# Patient Record
Sex: FEMALE | Race: WHITE | Employment: FULL TIME | URBAN - METROPOLITAN AREA
[De-identification: names, ages, dates, MRNs, and addresses within clinical notes are randomized per-mention and may not be internally consistent; named-entity substitution may affect disease eponyms.]

---
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
## 2021-10-15 ENCOUNTER — HOSPITAL ENCOUNTER (EMERGENCY)
Age: 21
Discharge: HOME | End: 2021-10-15
Attending: EMERGENCY MEDICINE

## 2021-10-15 ENCOUNTER — APPOINTMENT (OUTPATIENT)
Dept: GENERAL RADIOLOGY | Age: 21
End: 2021-10-15
Attending: EMERGENCY MEDICINE

## 2021-10-15 VITALS
HEART RATE: 99 BPM | WEIGHT: 180 LBS | DIASTOLIC BLOOD PRESSURE: 63 MMHG | OXYGEN SATURATION: 96 % | SYSTOLIC BLOOD PRESSURE: 120 MMHG | RESPIRATION RATE: 16 BRPM | HEIGHT: 62 IN | BODY MASS INDEX: 33.13 KG/M2 | TEMPERATURE: 98.6 F

## 2021-10-15 DIAGNOSIS — R07.89 CHEST WALL PAIN: Primary | ICD-10-CM

## 2021-10-15 LAB
ALBUMIN SERPL-MCNC: 4.2 G/DL (ref 3.5–5)
ALBUMIN/GLOB SERPL: 1 {RATIO} (ref 1.2–3.5)
ALP SERPL-CCNC: 78 U/L (ref 50–136)
ALT SERPL-CCNC: 30 U/L (ref 12–65)
ANION GAP SERPL CALC-SCNC: 7 MMOL/L (ref 7–16)
AST SERPL-CCNC: 23 U/L (ref 15–37)
ATRIAL RATE: 95 BPM
BASOPHILS # BLD: 0 K/UL (ref 0–0.2)
BASOPHILS NFR BLD: 1 % (ref 0–2)
BILIRUB SERPL-MCNC: 0.6 MG/DL (ref 0.2–1.1)
BUN SERPL-MCNC: 9 MG/DL (ref 6–23)
CALCIUM SERPL-MCNC: 9.4 MG/DL (ref 8.3–10.4)
CALCULATED P AXIS, ECG09: 32 DEGREES
CALCULATED R AXIS, ECG10: 100 DEGREES
CALCULATED T AXIS, ECG11: 39 DEGREES
CHLORIDE SERPL-SCNC: 106 MMOL/L (ref 98–107)
CO2 SERPL-SCNC: 24 MMOL/L (ref 21–32)
CREAT SERPL-MCNC: 0.85 MG/DL (ref 0.6–1)
DIAGNOSIS, 93000: NORMAL
DIFFERENTIAL METHOD BLD: NORMAL
EOSINOPHIL # BLD: 0.4 K/UL (ref 0–0.8)
EOSINOPHIL NFR BLD: 5 % (ref 0.5–7.8)
ERYTHROCYTE [DISTWIDTH] IN BLOOD BY AUTOMATED COUNT: 12.2 % (ref 11.9–14.6)
GLOBULIN SER CALC-MCNC: 4.3 G/DL (ref 2.3–3.5)
GLUCOSE SERPL-MCNC: 77 MG/DL (ref 65–100)
HCT VFR BLD AUTO: 40.4 % (ref 35.8–46.3)
HGB BLD-MCNC: 13.4 G/DL (ref 11.7–15.4)
IMM GRANULOCYTES # BLD AUTO: 0 K/UL (ref 0–0.5)
IMM GRANULOCYTES NFR BLD AUTO: 0 % (ref 0–5)
LIPASE SERPL-CCNC: 59 U/L (ref 73–393)
LYMPHOCYTES # BLD: 2 K/UL (ref 0.5–4.6)
LYMPHOCYTES NFR BLD: 23 % (ref 13–44)
MAGNESIUM SERPL-MCNC: 2.1 MG/DL (ref 1.8–2.4)
MCH RBC QN AUTO: 28 PG (ref 26.1–32.9)
MCHC RBC AUTO-ENTMCNC: 33.2 G/DL (ref 31.4–35)
MCV RBC AUTO: 84.5 FL (ref 79.6–97.8)
MONOCYTES # BLD: 1 K/UL (ref 0.1–1.3)
MONOCYTES NFR BLD: 12 % (ref 4–12)
NEUTS SEG # BLD: 5.1 K/UL (ref 1.7–8.2)
NEUTS SEG NFR BLD: 60 % (ref 43–78)
NRBC # BLD: 0 K/UL (ref 0–0.2)
P-R INTERVAL, ECG05: 118 MS
PLATELET # BLD AUTO: 319 K/UL (ref 150–450)
PMV BLD AUTO: 9.4 FL (ref 9.4–12.3)
POTASSIUM SERPL-SCNC: 3.7 MMOL/L (ref 3.5–5.1)
PROT SERPL-MCNC: 8.5 G/DL (ref 6.3–8.2)
Q-T INTERVAL, ECG07: 340 MS
QRS DURATION, ECG06: 80 MS
QTC CALCULATION (BEZET), ECG08: 427 MS
RBC # BLD AUTO: 4.78 M/UL (ref 4.05–5.2)
SODIUM SERPL-SCNC: 137 MMOL/L (ref 136–145)
TROPONIN-HIGH SENSITIVITY: 4.5 PG/ML (ref 0–14)
TROPONIN-HIGH SENSITIVITY: 4.6 PG/ML (ref 0–14)
VENTRICULAR RATE, ECG03: 95 BPM
WBC # BLD AUTO: 8.6 K/UL (ref 4.3–11.1)

## 2021-10-15 PROCEDURE — 83690 ASSAY OF LIPASE: CPT

## 2021-10-15 PROCEDURE — 74011250637 HC RX REV CODE- 250/637: Performed by: PHYSICIAN ASSISTANT

## 2021-10-15 PROCEDURE — 84484 ASSAY OF TROPONIN QUANT: CPT

## 2021-10-15 PROCEDURE — 83735 ASSAY OF MAGNESIUM: CPT

## 2021-10-15 PROCEDURE — 99285 EMERGENCY DEPT VISIT HI MDM: CPT

## 2021-10-15 PROCEDURE — 85025 COMPLETE CBC W/AUTO DIFF WBC: CPT

## 2021-10-15 PROCEDURE — 80053 COMPREHEN METABOLIC PANEL: CPT

## 2021-10-15 PROCEDURE — 74011250636 HC RX REV CODE- 250/636: Performed by: PHYSICIAN ASSISTANT

## 2021-10-15 PROCEDURE — 71046 X-RAY EXAM CHEST 2 VIEWS: CPT

## 2021-10-15 PROCEDURE — 94762 N-INVAS EAR/PLS OXIMTRY CONT: CPT

## 2021-10-15 PROCEDURE — 93005 ELECTROCARDIOGRAM TRACING: CPT | Performed by: EMERGENCY MEDICINE

## 2021-10-15 PROCEDURE — 96374 THER/PROPH/DIAG INJ IV PUSH: CPT

## 2021-10-15 RX ORDER — LIDOCAINE 50 MG/G
PATCH TOPICAL
Qty: 5 EACH | Refills: 0 | Status: SHIPPED | OUTPATIENT
Start: 2021-10-15

## 2021-10-15 RX ORDER — SODIUM CHLORIDE 0.9 % (FLUSH) 0.9 %
5-10 SYRINGE (ML) INJECTION AS NEEDED
Status: DISCONTINUED | OUTPATIENT
Start: 2021-10-15 | End: 2021-10-15 | Stop reason: HOSPADM

## 2021-10-15 RX ORDER — LORAZEPAM 1 MG/1
1 TABLET ORAL
Status: COMPLETED | OUTPATIENT
Start: 2021-10-15 | End: 2021-10-15

## 2021-10-15 RX ORDER — IBUPROFEN 600 MG/1
600 TABLET ORAL
Qty: 20 TABLET | Refills: 0 | Status: SHIPPED | OUTPATIENT
Start: 2021-10-15

## 2021-10-15 RX ORDER — KETOROLAC TROMETHAMINE 15 MG/ML
15 INJECTION, SOLUTION INTRAMUSCULAR; INTRAVENOUS
Status: COMPLETED | OUTPATIENT
Start: 2021-10-15 | End: 2021-10-15

## 2021-10-15 RX ORDER — SODIUM CHLORIDE 0.9 % (FLUSH) 0.9 %
5-10 SYRINGE (ML) INJECTION EVERY 8 HOURS
Status: DISCONTINUED | OUTPATIENT
Start: 2021-10-15 | End: 2021-10-15 | Stop reason: HOSPADM

## 2021-10-15 RX ADMIN — LORAZEPAM 1 MG: 1 TABLET ORAL at 15:39

## 2021-10-15 RX ADMIN — KETOROLAC TROMETHAMINE 15 MG: 15 INJECTION, SOLUTION INTRAMUSCULAR; INTRAVENOUS at 15:40

## 2021-10-15 NOTE — DISCHARGE INSTRUCTIONS
Take Tylenol every 4-6 hours and ibuprofen every 6-8 hours as needed for pain. You may apply Lidoderm patch to the area for symptom relief as well. In plenty fluids and rest, please follow-up closely with the primary doctor and return for any emergent or severely worsening symptoms.

## 2021-10-15 NOTE — Clinical Note
[address] [facility] EMERGENCY DEPT   [facility]  [place] [facility] [id number] [phone]    Work/School Note    Date: 10/15/2021    To Whom It May concern:    Livier Colmenares was seen and treated today in the emergency room by the following provider(s):  Attending Provider: Keith Jackson DO  Physician Assistant: Vicki Hurtado. Livier Colmenares is excused from work/school on 10/15/21 and 10/16/21. She is medically clear to return to work/school on 10/17/2021.        Sincerely,          Rizwana Perez, [address]

## 2021-10-15 NOTE — ED PROVIDER NOTES
59-year-old previously healthy female comes in with concern for chest pain that started at 3 in the morning. Patient reports pain is worse with certain movements and with deep inspiration. Patient has no cough congestion or fever. She denies hemoptysis, leg swelling or shortness of breath. She denies recent immobilization or history of blood clots. She denies nausea, vomiting, diarrhea or dysuria. No past medical history on file. Past Surgical History:   Procedure Laterality Date    HX APPENDECTOMY           No family history on file. Social History     Socioeconomic History    Marital status: SINGLE     Spouse name: Not on file    Number of children: Not on file    Years of education: Not on file    Highest education level: Not on file   Occupational History    Not on file   Tobacco Use    Smoking status: Never Smoker    Smokeless tobacco: Never Used   Substance and Sexual Activity    Alcohol use: No    Drug use: Never    Sexual activity: Yes     Partners: Male   Other Topics Concern    Not on file   Social History Narrative    Not on file     Social Determinants of Health     Financial Resource Strain:     Difficulty of Paying Living Expenses:    Food Insecurity:     Worried About Running Out of Food in the Last Year:     [address] in the Last Year:    Transportation Needs:     Lack of Transportation (Medical):      Lack of Transportation (Non-Medical):    Physical Activity:     Days of Exercise per Week:     Minutes of Exercise per Session:    Stress:     Feeling of Stress :    Social Connections:     Frequency of Communication with Friends and Family:     Frequency of Social Gatherings with Friends and Family:     Attends Scientologist Services:     Active Member of Clubs or Organizations:     Attends Club or Organization Meetings:     Marital Status:    Intimate Partner Violence:     Fear of Current or Ex-Partner:     Emotionally Abused:     Physically Abused:  Sexually Abused: ALLERGIES: Patient has no known allergies. Review of Systems   Constitutional: Negative for chills and fever. Respiratory: Negative for cough and shortness of breath. Cardiovascular: Positive for chest pain. Negative for palpitations and leg swelling. Gastrointestinal: Negative for abdominal pain, nausea and vomiting. Skin: Negative for color change. All other systems reviewed and are negative. Vitals:    10/15/21 1215   BP: (!) 137/90   Pulse: 99   Resp: 16   Temp: 98.6 °F (37 °C)   SpO2: 98%   Weight: 81.6 kg (180 lb)   Height: 5' 2\" (1.575 m)            Physical Exam  Vitals and nursing note reviewed. Constitutional:       General: She is not in acute distress. Appearance: Normal appearance. She is not ill-appearing, toxic-appearing or diaphoretic. HENT:      Head: Normocephalic and atraumatic. Eyes:      Conjunctiva/sclera: Conjunctivae normal.      Pupils: Pupils are equal, round, and reactive to light. Neck:      Vascular: No JVD. Cardiovascular:      Rate and Rhythm: Normal rate and regular rhythm. Pulses: Normal pulses. Heart sounds: Normal heart sounds. Heart sounds not distant. No murmur heard. No systolic murmur is present. No diastolic murmur is present. No friction rub. No gallop. No S3 or S4 sounds. Pulmonary:      Effort: Pulmonary effort is normal. No respiratory distress. Breath sounds: Normal breath sounds and air entry. No stridor, decreased air movement or transmitted upper airway sounds. No decreased breath sounds, wheezing, rhonchi or rales. Chest:      Chest wall: No crepitus. There is no dullness to percussion. Abdominal:      General: Bowel sounds are normal.      Palpations: Abdomen is soft. Tenderness: There is no abdominal tenderness. Musculoskeletal:      Cervical back: Normal range of motion and neck supple. Right lower leg: No tenderness. No edema. Left lower leg: No tenderness. No edema. Skin:     General: Skin is warm and dry. Capillary Refill: Capillary refill takes less than 2 seconds. Neurological:      General: No focal deficit present. Mental Status: She is alert and oriented to person, place, and time. Mental status is at baseline. Psychiatric:         Mood and Affect: Mood is anxious. MDM  Number of Diagnoses or Management Options  Chest wall pain: new and requires workup  Diagnosis management comments: 80-year-old previously healthy female comes in with concern for chest pain that started at 3 in the morning. Patient reports pain is worse with certain movements and with deep inspiration. Patient has no cough congestion or fever. She is well-appearing nontoxic and afebrile. Patient does report she is mildly anxious and has history of this but has not had chest pain with her anxiety in the past.  Will check labs, EKG, chest x-ray, troponin x2. EKG shows no ischemic changes or abnormality, the chest x-ray was negative and the labs are unremarkable including negative troponin. Patient was given a dose of Toradol and Ativan. We will advise close follow-up with PCP and return precautions are discussed. Patient also advised to apply Lidoderm patches to the area for symptom relief as well as to take Tylenol and ibuprofen as needed for pain. If anxiety-like symptoms persist, advised to follow-up with the PCP for further evaluation and treatment. EKG    Date/Time: 10/15/2021 3:24 PM  Performed by: SUZANNE Sy  Authorized by: SUZANNE Sy     Previous ECG:     Previous ECG:  Unavailable  Comments:      EKG shows normal sinus rhythm, rate of 95 bpm, rightward axis, normal intervals, , QTc 427, no acute ST or T wave changes, no STEMI.

## 2021-10-15 NOTE — ED NOTES
I have reviewed discharge instructions with the patient. The patient verbalized understanding. Patient left ED via Discharge Method: ambulatory to Home with boyfriend  Opportunity for questions and clarification provided. Patient given 2 scripts. To continue your aftercare when you leave the hospital, you may receive an automated call from our care team to check in on how you are doing. This is a free service and part of our promise to provide the best care and service to meet your aftercare needs.  If you have questions, or wish to unsubscribe from this service please call [phone]. Thank you for Choosing our [facility] Emergency Department.

## 2022-03-18 PROBLEM — R07.89 CHEST WALL PAIN: Status: ACTIVE | Noted: 2021-10-15

## 2022-09-08 ENCOUNTER — INITIAL PRENATAL (OUTPATIENT)
Dept: OBGYN CLINIC | Age: 22
End: 2022-09-08
Payer: MEDICAID

## 2022-09-08 VITALS — BODY MASS INDEX: 31.65 KG/M2 | HEIGHT: 62 IN | WEIGHT: 172 LBS

## 2022-09-08 DIAGNOSIS — Z3A.13 13 WEEKS GESTATION OF PREGNANCY: ICD-10-CM

## 2022-09-08 DIAGNOSIS — O36.80X0 ENCOUNTER TO DETERMINE FETAL VIABILITY OF PREGNANCY, SINGLE OR UNSPECIFIED FETUS: ICD-10-CM

## 2022-09-08 DIAGNOSIS — Z13.79 GENETIC TESTING: Primary | ICD-10-CM

## 2022-09-08 DIAGNOSIS — Z32.01 PREGNANCY TEST POSITIVE: ICD-10-CM

## 2022-09-08 DIAGNOSIS — Z34.02 ENCOUNTER FOR SUPERVISION OF NORMAL FIRST PREGNANCY IN SECOND TRIMESTER: ICD-10-CM

## 2022-09-08 PROBLEM — R07.89 CHEST WALL PAIN: Status: RESOLVED | Noted: 2021-10-15 | Resolved: 2022-09-08

## 2022-09-08 PROBLEM — Z34.90 PREGNANCY: Status: ACTIVE | Noted: 2022-09-08

## 2022-09-08 LAB
ABO + RH BLD: NORMAL
ABO, EXTERNAL RESULT: NORMAL
BASOPHILS # BLD: 0 K/UL (ref 0–0.2)
BASOPHILS NFR BLD: 1 % (ref 0–2)
BLOOD GROUP ANTIBODIES SERPL: NORMAL
DIFFERENTIAL METHOD BLD: ABNORMAL
EOSINOPHIL # BLD: 0.1 K/UL (ref 0–0.8)
EOSINOPHIL NFR BLD: 1 % (ref 0.5–7.8)
ERYTHROCYTE [DISTWIDTH] IN BLOOD BY AUTOMATED COUNT: 12.3 % (ref 11.9–14.6)
HBV SURFACE AG SER QL: NONREACTIVE
HCG, PREGNANCY, URINE, POC: POSITIVE
HCT VFR BLD AUTO: 36.3 % (ref 35.8–46.3)
HCV AB SER QL: NONREACTIVE
HEP B, EXTERNAL RESULT: NORMAL
HEPATITIS C ANTIBODY, EXTERNAL RESULT: NORMAL
HGB BLD-MCNC: 12.5 G/DL (ref 11.7–15.4)
HIV 1+2 AB+HIV1 P24 AG SERPL QL IA: NONREACTIVE
HIV 1/2 RESULT COMMENT: NORMAL
HIV, EXTERNAL RESULT: NORMAL
IMM GRANULOCYTES # BLD AUTO: 0 K/UL (ref 0–0.5)
IMM GRANULOCYTES NFR BLD AUTO: 0 % (ref 0–5)
LYMPHOCYTES # BLD: 0.7 K/UL (ref 0.5–4.6)
LYMPHOCYTES NFR BLD: 10 % (ref 13–44)
MCH RBC QN AUTO: 29.3 PG (ref 26.1–32.9)
MCHC RBC AUTO-ENTMCNC: 34.4 G/DL (ref 31.4–35)
MCV RBC AUTO: 85.2 FL (ref 79.6–97.8)
MONOCYTES # BLD: 0.9 K/UL (ref 0.1–1.3)
MONOCYTES NFR BLD: 12 % (ref 4–12)
NEUTS SEG # BLD: 5.8 K/UL (ref 1.7–8.2)
NEUTS SEG NFR BLD: 76 % (ref 43–78)
NRBC # BLD: 0 K/UL (ref 0–0.2)
PLATELET # BLD AUTO: 263 K/UL (ref 150–450)
PMV BLD AUTO: 10.2 FL (ref 9.4–12.3)
RBC # BLD AUTO: 4.26 M/UL (ref 4.05–5.2)
RH FACTOR, EXTERNAL RESULT: POSITIVE
RPR, EXTERNAL RESULT: NORMAL
RUBELLA TITER, EXTERNAL RESULT: NORMAL
RUBV IGG SERPL IA-ACNC: 129.5 IU/ML (ref 0–50)
VALID INTERNAL CONTROL, POC: YES
WBC # BLD AUTO: 7.4 K/UL (ref 4.3–11.1)

## 2022-09-08 PROCEDURE — 76801 OB US < 14 WKS SINGLE FETUS: CPT | Performed by: NURSE PRACTITIONER

## 2022-09-08 PROCEDURE — 81025 URINE PREGNANCY TEST: CPT | Performed by: NURSE PRACTITIONER

## 2022-09-08 PROCEDURE — 76817 TRANSVAGINAL US OBSTETRIC: CPT | Performed by: NURSE PRACTITIONER

## 2022-09-08 NOTE — PROGRESS NOTES
Poppy Perez G1, P0 presents to the office today for NOB talk and ultrasound. EDC is 3/16/23 based off of LMP. Patient education was discussed including: nutrition, appropriate weight gain, diet, exercise, travel, hospital classes, breastfeeding/lactation services, flu vaccine, Tdap, glucola, GBS, and Corona Virus and Zika precautions. Genetic testing discussed in depth and patient elects NIPT. She is to return to the office in 1 weeks for NOB exam. All questions answered and she voiced full understanding. She is encouraged to call the office with any questions or concerns.

## 2022-09-09 LAB
RPR SER QL: NONREACTIVE
VZV IGG SER IA-ACNC: <135 INDEX

## 2022-09-12 LAB
BACTERIA SPEC CULT: NORMAL
BACTERIA SPEC CULT: NORMAL
SERVICE CMNT-IMP: NORMAL

## 2022-09-13 LAB
HGB A MFR BLD: 97.8 % (ref 96.4–98.8)
HGB A2 MFR BLD COLUMN CHROM: 2.2 % (ref 1.8–3.2)
HGB F MFR BLD: 0 % (ref 0–2)
HGB FRACT BLD-IMP: NORMAL
HGB S MFR BLD: 0 %

## 2022-09-14 ENCOUNTER — ROUTINE PRENATAL (OUTPATIENT)
Dept: OBGYN CLINIC | Age: 22
End: 2022-09-14
Payer: MEDICAID

## 2022-09-14 VITALS — DIASTOLIC BLOOD PRESSURE: 76 MMHG | WEIGHT: 172.8 LBS | SYSTOLIC BLOOD PRESSURE: 126 MMHG | BODY MASS INDEX: 31.61 KG/M2

## 2022-09-14 DIAGNOSIS — Z34.02 ENCOUNTER FOR SUPERVISION OF NORMAL FIRST PREGNANCY IN SECOND TRIMESTER: Primary | ICD-10-CM

## 2022-09-14 DIAGNOSIS — Z11.8 SCREENING FOR VIRAL AND CHLAMYDIAL DISEASES: ICD-10-CM

## 2022-09-14 DIAGNOSIS — O09.899 MATERNAL VARICELLA, NON-IMMUNE: ICD-10-CM

## 2022-09-14 DIAGNOSIS — Z3A.13 13 WEEKS GESTATION OF PREGNANCY: ICD-10-CM

## 2022-09-14 DIAGNOSIS — Z11.3 SCREENING EXAMINATION FOR VENEREAL DISEASE: ICD-10-CM

## 2022-09-14 DIAGNOSIS — Z12.4 PAP SMEAR FOR CERVICAL CANCER SCREENING: ICD-10-CM

## 2022-09-14 DIAGNOSIS — Z11.59 SCREENING FOR VIRAL AND CHLAMYDIAL DISEASES: ICD-10-CM

## 2022-09-14 DIAGNOSIS — Z28.39 MATERNAL VARICELLA, NON-IMMUNE: ICD-10-CM

## 2022-09-14 DIAGNOSIS — Z13.89 SCREENING FOR GENITOURINARY CONDITION: ICD-10-CM

## 2022-09-14 LAB
GLUCOSE URINE, POC: NEGATIVE
PROTEIN,URINE, POC: NEGATIVE

## 2022-09-14 PROCEDURE — 99204 OFFICE O/P NEW MOD 45 MIN: CPT | Performed by: OBSTETRICS & GYNECOLOGY

## 2022-09-14 PROCEDURE — 81002 URINALYSIS NONAUTO W/O SCOPE: CPT | Performed by: OBSTETRICS & GYNECOLOGY

## 2022-09-14 NOTE — PROGRESS NOTES
Ms. Lobo Louis  presents today for her initial OB exam.  She feels well. NIPT- elected drawn 22-pending  Last pap smear: years ago- due today    Patient's last menstrual period was 2022. Estimated Date of Delivery: 3/16/23  OB History:   OB History    Para Term  AB Living   1 0 0 0 0 0   SAB IAB Ectopic Molar Multiple Live Births   0 0 0 0 0 0      # Outcome Date GA Lbr Matty/2nd Weight Sex Delivery Anes PTL Lv   1 Current                Past Gyn History:   GYN History       Regular cycles. No abnl pap or stis. Allergies:   No Known Allergies    Medication History:  Current Outpatient Medications   Medication Sig Dispense Refill    Prenatal-FeFum-FA-DHA w/o A (PRENATAL + DHA PO) Take by mouth       No current facility-administered medications for this visit. Past Medical History:  No past medical history on file. Past Surgical History:  Past Surgical History:   Procedure Laterality Date    APPENDECTOMY         Social History:  Social History     Socioeconomic History    Marital status: Single - 5 years together. 25years old. Work at [employer]. [employer].       Spouse name: Not on file    Number of children: Not on file    Years of education: Not on file    Highest education level: Not on file   Occupational History    Works for [employer]    Tobacco Use    Smoking status: Never    Smokeless tobacco: Never   Vaping Use    Vaping Use: Never used   Substance and Sexual Activity    Alcohol use: No    Drug use: Never    Sexual activity: Yes     Partners: Male   Other Topics Concern    Exercise:  not really    Social History Narrative    Not on file     Social Determinants of Health     Financial Resource Strain: Not on file   Food Insecurity: Not on file   Transportation Needs: Not on file   Physical Activity: Not on file   Stress: Not on file   Social Connections: Not on file   Intimate Partner Violence: Not on file   Housing Stability: Not on file genetic testing -pt already had nipt. All questions were answered. RTC 4 weeks.

## 2022-09-20 LAB
C TRACH RRNA CVX QL NAA+PROBE: NEGATIVE
CYTOLOGIST CVX/VAG CYTO: NORMAL
CYTOLOGY CVX/VAG DOC THIN PREP: NORMAL
HPV REFLEX: NORMAL
Lab: NORMAL
N GONORRHOEA RRNA CVX QL NAA+PROBE: NEGATIVE
PATH REPORT.FINAL DX SPEC: NORMAL
STAT OF ADQ CVX/VAG CYTO-IMP: NORMAL
T VAGINALIS RRNA SPEC QL NAA+PROBE: NEGATIVE

## 2022-09-22 ENCOUNTER — TELEPHONE (OUTPATIENT)
Dept: OBGYN CLINIC | Age: 22
End: 2022-09-22

## 2022-09-22 RX ORDER — FLUCONAZOLE 150 MG/1
TABLET ORAL
Qty: 2 TABLET | Refills: 0 | Status: SHIPPED | OUTPATIENT
Start: 2022-09-22 | End: 2022-10-13

## 2022-09-22 NOTE — TELEPHONE ENCOUNTER
Spoke with patient and notified her of results of pap smear. Pharmacy verified. Prescription sent. Pt v/u. No additional questions at this time.     Orders Placed This Encounter    fluconazole (DIFLUCAN) 150 MG tablet     Sig: Take one tablet by oral route now and repeat in 3 days     Dispense:  2 tablet     Refill:  0

## 2022-09-22 NOTE — TELEPHONE ENCOUNTER
----- Message from Tonio Guido DO sent at 9/22/2022  8:38 AM EDT -----  Neg pap. Yeast present. Tx with diflucan 150mg po x 1 and repeat 3-4 days.   Ty!

## 2022-10-13 ENCOUNTER — ROUTINE PRENATAL (OUTPATIENT)
Dept: OBGYN CLINIC | Age: 22
End: 2022-10-13
Payer: MEDICAID

## 2022-10-13 VITALS — SYSTOLIC BLOOD PRESSURE: 118 MMHG | DIASTOLIC BLOOD PRESSURE: 72 MMHG | BODY MASS INDEX: 33.95 KG/M2 | WEIGHT: 185.6 LBS

## 2022-10-13 DIAGNOSIS — Z3A.18 18 WEEKS GESTATION OF PREGNANCY: ICD-10-CM

## 2022-10-13 DIAGNOSIS — Z13.89 SCREENING FOR GENITOURINARY CONDITION: ICD-10-CM

## 2022-10-13 DIAGNOSIS — Z34.02 ENCOUNTER FOR SUPERVISION OF NORMAL FIRST PREGNANCY IN SECOND TRIMESTER: Primary | ICD-10-CM

## 2022-10-13 LAB
GLUCOSE URINE, POC: NEGATIVE
PROTEIN,URINE, POC: NEGATIVE

## 2022-10-13 PROCEDURE — 81002 URINALYSIS NONAUTO W/O SCOPE: CPT | Performed by: NURSE PRACTITIONER

## 2022-10-13 PROCEDURE — 99213 OFFICE O/P EST LOW 20 MIN: CPT | Performed by: NURSE PRACTITIONER

## 2022-10-26 ENCOUNTER — ROUTINE PRENATAL (OUTPATIENT)
Dept: OBGYN CLINIC | Age: 22
End: 2022-10-26
Payer: MEDICAID

## 2022-10-26 VITALS — SYSTOLIC BLOOD PRESSURE: 126 MMHG | WEIGHT: 188.8 LBS | BODY MASS INDEX: 34.53 KG/M2 | DIASTOLIC BLOOD PRESSURE: 74 MMHG

## 2022-10-26 DIAGNOSIS — Z13.89 SCREENING FOR GENITOURINARY CONDITION: ICD-10-CM

## 2022-10-26 DIAGNOSIS — Z34.02 ENCOUNTER FOR SUPERVISION OF NORMAL FIRST PREGNANCY IN SECOND TRIMESTER: ICD-10-CM

## 2022-10-26 DIAGNOSIS — Z3A.19 19 WEEKS GESTATION OF PREGNANCY: ICD-10-CM

## 2022-10-26 DIAGNOSIS — Z36.3 ENCOUNTER FOR ROUTINE SCREENING FOR FETAL MALFORMATION USING ULTRASOUND: Primary | ICD-10-CM

## 2022-10-26 LAB
GLUCOSE URINE, POC: NEGATIVE
PROTEIN,URINE, POC: NEGATIVE

## 2022-10-26 PROCEDURE — 81002 URINALYSIS NONAUTO W/O SCOPE: CPT | Performed by: OBSTETRICS & GYNECOLOGY

## 2022-10-26 PROCEDURE — 99213 OFFICE O/P EST LOW 20 MIN: CPT | Performed by: OBSTETRICS & GYNECOLOGY

## 2022-10-26 PROCEDURE — 76805 OB US >/= 14 WKS SNGL FETUS: CPT | Performed by: OBSTETRICS & GYNECOLOGY

## 2022-10-26 NOTE — PROGRESS NOTES
Patient Active Problem List    Diagnosis Date Noted    Maternal varicella, non-immune 09/14/2022    Pregnancy 09/08/2022     Very limited exam- position and movement  Recheck placenta at next visit with anatomic markers too  Repeat in 4 weeks

## 2022-12-01 ENCOUNTER — ROUTINE PRENATAL (OUTPATIENT)
Dept: OBGYN CLINIC | Age: 22
End: 2022-12-01
Payer: MEDICAID

## 2022-12-01 VITALS — DIASTOLIC BLOOD PRESSURE: 70 MMHG | WEIGHT: 193.8 LBS | BODY MASS INDEX: 35.45 KG/M2 | SYSTOLIC BLOOD PRESSURE: 112 MMHG

## 2022-12-01 DIAGNOSIS — O43.122 VELAMENTOUS INSERTION OF UMBILICAL CORD IN SECOND TRIMESTER: ICD-10-CM

## 2022-12-01 DIAGNOSIS — Z34.02 ENCOUNTER FOR SUPERVISION OF NORMAL FIRST PREGNANCY IN SECOND TRIMESTER: ICD-10-CM

## 2022-12-01 DIAGNOSIS — Z13.89 SCREENING FOR GENITOURINARY CONDITION: ICD-10-CM

## 2022-12-01 DIAGNOSIS — Z36.2 ENCOUNTER FOR FOLLOW-UP ULTRASOUND OF FETAL ANATOMY: Primary | ICD-10-CM

## 2022-12-01 DIAGNOSIS — Z3A.25 25 WEEKS GESTATION OF PREGNANCY: ICD-10-CM

## 2022-12-01 PROBLEM — O43.129 VELAMENTOUS INSERTION OF UMBILICAL CORD: Status: ACTIVE | Noted: 2022-12-01

## 2022-12-01 LAB
GLUCOSE URINE, POC: NEGATIVE
PROTEIN,URINE, POC: NEGATIVE

## 2022-12-01 PROCEDURE — 81002 URINALYSIS NONAUTO W/O SCOPE: CPT | Performed by: OBSTETRICS & GYNECOLOGY

## 2022-12-01 PROCEDURE — 76816 OB US FOLLOW-UP PER FETUS: CPT | Performed by: OBSTETRICS & GYNECOLOGY

## 2022-12-01 PROCEDURE — 99213 OFFICE O/P EST LOW 20 MIN: CPT | Performed by: OBSTETRICS & GYNECOLOGY

## 2022-12-01 NOTE — PROGRESS NOTES
Follow up anatomy   +FHT= 157   No markers for aneuploidy visualized  All visualized anatomy appears within normal limits. Anatomy scan complete. Cervix>3 cm, no previa visualized. Posterior placenta, grade 1. Suspected velamentous cord insertion.    Breech presentation

## 2022-12-21 ENCOUNTER — TELEPHONE (OUTPATIENT)
Dept: OBGYN CLINIC | Age: 22
End: 2022-12-21

## 2023-01-04 NOTE — PROGRESS NOTES
25 y.o.  at 33w8d  who is here for routine OB visit. Growth US next visit due to velamentous CI.  1-hr GTT today   Flu shot desires, will give today  Tdap desires, will give today    HISTORY:  OB History    Para Term  AB Living   1             SAB IAB Ectopic Molar Multiple Live Births                    # Outcome Date GA Lbr Matty/2nd Weight Sex Delivery Anes PTL Lv   1 Current               Patient's last menstrual period was 2022. Social History     Substance and Sexual Activity   Sexual Activity Yes    Partners: Male      History reviewed. No pertinent past medical history. Past Surgical History:   Procedure Laterality Date    APPENDECTOMY         ROS:  Feeling well. No dyspnea or chest pain on exertion. No abdominal pain, change in bowel habits, black or bloody stools. No urinary tract symptoms. OB ROS: No vaginal bleeding, cxns, LOF, decreased FM. No HA, vision changes, abdominal pain. PHYSICAL EXAM:  /68   Wt 199 lb 9.6 oz (90.5 kg)   LMP 2022   BMI 36.51 kg/m²        ASSESSMENT / PLAN:  1. Encounter for supervision of normal first pregnancy in third trimester  -     AMB POC OB URINE DIP  2. 30 weeks gestation of pregnancy  Overview:  NIPT-low risk  GTT- collected 23  COVID-not vaccinated  Flu- given 23  Tdap- given 23  Orders:  -     AMB POC OB URINE DIP  -     Glucose tolerance, 1 hour; Future  -     Hemoglobin; Future  3. Velamentous insertion of umbilical cord in third trimester  Overview:  Check growth approx 32 weeks  4. Screening for genitourinary condition  -     AMB POC OB URINE DIP  5. Screening for diabetes mellitus  -     Glucose tolerance, 1 hour; Future  6.  Screening for iron deficiency anemia  -     Hemoglobin; Future

## 2023-01-04 NOTE — PROGRESS NOTES
25 y.o.  at 33w8d  who is here for routine OB visit. HISTORY:  OB History    Para Term  AB Living   1             SAB IAB Ectopic Molar Multiple Live Births                    # Outcome Date GA Lbr Matty/2nd Weight Sex Delivery Anes PTL Lv   1 Current               Patient's last menstrual period was 2022. Social History     Substance and Sexual Activity   Sexual Activity Yes    Partners: Male      No past medical history on file. Past Surgical History:   Procedure Laterality Date    APPENDECTOMY         ROS:  Feeling well. No dyspnea or chest pain on exertion. No abdominal pain, change in bowel habits, black or bloody stools. No urinary tract symptoms. OB ROS: No vaginal bleeding, cxns, LOF, decreased FM. No HA, vision changes, abdominal pain. PHYSICAL EXAM:  LMP 2022        ASSESSMENT / PLAN:  {There are no diagnoses linked to this encounter.  (Refresh or delete this SmartLink)}

## 2023-01-05 ENCOUNTER — ROUTINE PRENATAL (OUTPATIENT)
Dept: OBGYN CLINIC | Age: 23
End: 2023-01-05
Payer: COMMERCIAL

## 2023-01-05 VITALS — WEIGHT: 199.6 LBS | SYSTOLIC BLOOD PRESSURE: 118 MMHG | BODY MASS INDEX: 36.51 KG/M2 | DIASTOLIC BLOOD PRESSURE: 68 MMHG

## 2023-01-05 DIAGNOSIS — Z34.03 ENCOUNTER FOR SUPERVISION OF NORMAL FIRST PREGNANCY IN THIRD TRIMESTER: Primary | ICD-10-CM

## 2023-01-05 DIAGNOSIS — Z13.1 SCREENING FOR DIABETES MELLITUS: ICD-10-CM

## 2023-01-05 DIAGNOSIS — Z13.89 SCREENING FOR GENITOURINARY CONDITION: ICD-10-CM

## 2023-01-05 DIAGNOSIS — Z3A.30 30 WEEKS GESTATION OF PREGNANCY: ICD-10-CM

## 2023-01-05 DIAGNOSIS — Z13.0 SCREENING FOR IRON DEFICIENCY ANEMIA: ICD-10-CM

## 2023-01-05 DIAGNOSIS — O43.123 VELAMENTOUS INSERTION OF UMBILICAL CORD IN THIRD TRIMESTER: ICD-10-CM

## 2023-01-05 DIAGNOSIS — Z23 ENCOUNTER FOR IMMUNIZATION: ICD-10-CM

## 2023-01-05 LAB
GLUCOSE URINE, POC: NEGATIVE
PROTEIN,URINE, POC: NEGATIVE

## 2023-01-05 PROCEDURE — 99213 OFFICE O/P EST LOW 20 MIN: CPT | Performed by: STUDENT IN AN ORGANIZED HEALTH CARE EDUCATION/TRAINING PROGRAM

## 2023-01-05 PROCEDURE — 90471 IMMUNIZATION ADMIN: CPT | Performed by: STUDENT IN AN ORGANIZED HEALTH CARE EDUCATION/TRAINING PROGRAM

## 2023-01-05 PROCEDURE — 81002 URINALYSIS NONAUTO W/O SCOPE: CPT | Performed by: STUDENT IN AN ORGANIZED HEALTH CARE EDUCATION/TRAINING PROGRAM

## 2023-01-05 PROCEDURE — 90715 TDAP VACCINE 7 YRS/> IM: CPT | Performed by: STUDENT IN AN ORGANIZED HEALTH CARE EDUCATION/TRAINING PROGRAM

## 2023-01-05 PROCEDURE — 90674 CCIIV4 VAC NO PRSV 0.5 ML IM: CPT | Performed by: STUDENT IN AN ORGANIZED HEALTH CARE EDUCATION/TRAINING PROGRAM

## 2023-01-05 PROCEDURE — 90472 IMMUNIZATION ADMIN EACH ADD: CPT | Performed by: STUDENT IN AN ORGANIZED HEALTH CARE EDUCATION/TRAINING PROGRAM

## 2023-01-06 ENCOUNTER — TELEPHONE (OUTPATIENT)
Dept: OBGYN CLINIC | Age: 23
End: 2023-01-06

## 2023-01-06 DIAGNOSIS — Z3A.30 30 WEEKS GESTATION OF PREGNANCY: ICD-10-CM

## 2023-01-06 PROBLEM — O99.013 ANEMIA DURING PREGNANCY IN THIRD TRIMESTER: Status: ACTIVE | Noted: 2023-01-06

## 2023-01-06 LAB
GLUCOSE 1 HOUR: 134 MG/DL
HGB BLD-MCNC: 10.7 G/DL (ref 11.7–15.4)

## 2023-01-06 NOTE — TELEPHONE ENCOUNTER
Attempted to contact patient about 1 hour GTT and Hgb results. No answer. VM box full. Pt does not have mychart.

## 2023-01-12 ENCOUNTER — TELEPHONE (OUTPATIENT)
Dept: OBGYN CLINIC | Age: 23
End: 2023-01-12

## 2023-01-12 NOTE — TELEPHONE ENCOUNTER
----- Message from Clifton Jones RN sent at 1/12/2023  8:42 AM EST -----  Patient left voice mail to go over [employer].     Phone number is [phone]

## 2023-01-12 NOTE — TELEPHONE ENCOUNTER
called Back, no answer-VM full. Patient called back and scheduled 3 hour GTT. NPO after midnight. Will do while here for next appt next week. Advised of low hgb.  start a daily iron supplement. The recommended brand is called slow release Fe, over the counter. Take daily with citric acid(OJ) for better absorption. Take opposite of when you take your prenatal vitamin. For instance, if you take your prenatal vitamin in the morning, take the iron supplement at night.

## 2023-01-18 DIAGNOSIS — Z34.03 ENCOUNTER FOR SUPERVISION OF NORMAL FIRST PREGNANCY IN THIRD TRIMESTER: Primary | ICD-10-CM

## 2023-01-18 DIAGNOSIS — R73.09 ABNORMAL GLUCOSE TOLERANCE TEST: ICD-10-CM

## 2023-01-18 DIAGNOSIS — Z3A.32 32 WEEKS GESTATION OF PREGNANCY: ICD-10-CM

## 2023-01-19 ENCOUNTER — ROUTINE PRENATAL (OUTPATIENT)
Dept: OBGYN CLINIC | Age: 23
End: 2023-01-19

## 2023-01-19 VITALS — SYSTOLIC BLOOD PRESSURE: 117 MMHG | BODY MASS INDEX: 36.8 KG/M2 | DIASTOLIC BLOOD PRESSURE: 68 MMHG | WEIGHT: 201.2 LBS

## 2023-01-19 DIAGNOSIS — Z13.89 SCREENING FOR GENITOURINARY CONDITION: ICD-10-CM

## 2023-01-19 DIAGNOSIS — O43.123 VELAMENTOUS INSERTION OF UMBILICAL CORD IN THIRD TRIMESTER: Primary | ICD-10-CM

## 2023-01-19 DIAGNOSIS — Z13.1 SCREENING FOR DIABETES MELLITUS: ICD-10-CM

## 2023-01-19 DIAGNOSIS — Z34.93 PRENATAL CARE, THIRD TRIMESTER: ICD-10-CM

## 2023-01-19 DIAGNOSIS — Z3A.32 32 WEEKS GESTATION OF PREGNANCY: ICD-10-CM

## 2023-01-19 LAB
GLUCOSE URINE, POC: NEGATIVE
PROTEIN,URINE, POC: NEGATIVE

## 2023-01-19 RX ORDER — FERROUS SULFATE 325(65) MG
325 TABLET ORAL
COMMUNITY

## 2023-01-19 NOTE — PROGRESS NOTES
3hr GTT today.    EFW today secondary to velamentous cord insertion  +FHT= 154  Overall EFW= 57%  AC= 71%  JADA- 13cm  Posterior placenta, Grade 1  Vertex presentation

## 2023-01-19 NOTE — PROGRESS NOTES
Us today with ac 71%. Overall growth 57%. sandeep normal.  ptl precautions and kick counts. rtc 2 weeks. 4lbs 8oz. Taking 3 hour gtt today. occ is leaking breast milk. Encouraged her not to stimulate -just wear breast shield.

## 2023-02-06 ENCOUNTER — ROUTINE PRENATAL (OUTPATIENT)
Dept: OBGYN CLINIC | Age: 23
End: 2023-02-06
Payer: COMMERCIAL

## 2023-02-06 VITALS — WEIGHT: 204.8 LBS | SYSTOLIC BLOOD PRESSURE: 124 MMHG | BODY MASS INDEX: 37.46 KG/M2 | DIASTOLIC BLOOD PRESSURE: 82 MMHG

## 2023-02-06 DIAGNOSIS — Z3A.34 34 WEEKS GESTATION OF PREGNANCY: ICD-10-CM

## 2023-02-06 DIAGNOSIS — R31.9 HEMATURIA, UNSPECIFIED TYPE: ICD-10-CM

## 2023-02-06 DIAGNOSIS — Z13.89 ENCOUNTER FOR ROUTINE SCREENING FOR MALFORMATION USING ULTRASONICS: ICD-10-CM

## 2023-02-06 DIAGNOSIS — N76.1 SUBACUTE VAGINITIS: ICD-10-CM

## 2023-02-06 DIAGNOSIS — Z34.03 ENCOUNTER FOR SUPERVISION OF NORMAL FIRST PREGNANCY IN THIRD TRIMESTER: Primary | ICD-10-CM

## 2023-02-06 LAB
BILIRUBIN, URINE, POC: NEGATIVE
BLOOD URINE, POC: NEGATIVE
GLUCOSE URINE, POC: NEGATIVE
KETONES, URINE, POC: NORMAL
LEUKOCYTE ESTERASE, URINE, POC: NORMAL
NITRITE, URINE, POC: NEGATIVE
PH, URINE, POC: 5.5 (ref 4.6–8)
PROTEIN,URINE, POC: NORMAL
SPECIFIC GRAVITY, URINE, POC: 1.03 (ref 1–1.03)
URINALYSIS CLARITY, POC: NORMAL
URINALYSIS COLOR, POC: NORMAL
UROBILINOGEN, POC: NORMAL

## 2023-02-06 PROCEDURE — 99213 OFFICE O/P EST LOW 20 MIN: CPT | Performed by: OBSTETRICS & GYNECOLOGY

## 2023-02-06 PROCEDURE — 81002 URINALYSIS NONAUTO W/O SCOPE: CPT | Performed by: OBSTETRICS & GYNECOLOGY

## 2023-02-06 NOTE — PROGRESS NOTES
Was 1 time episode no other symptoms. Speculum exam is within normal limits no evidence of bleeding.'s mild erythema around the urethra cervix appears normal and is closed.   Did go ahead and do a new swab due to mild discharge is probably physiologic but would like to check

## 2023-02-09 LAB
A VAGINAE DNA VAG QL NAA+PROBE: ABNORMAL SCORE
BACTERIA SPEC CULT: NORMAL
BACTERIA SPEC CULT: NORMAL
BVAB2 DNA VAG QL NAA+PROBE: ABNORMAL SCORE
C ALBICANS DNA VAG QL NAA+PROBE: POSITIVE
C GLABRATA DNA VAG QL NAA+PROBE: NEGATIVE
C TRACH RRNA SPEC QL NAA+PROBE: NEGATIVE
MEGA1 DNA VAG QL NAA+PROBE: ABNORMAL SCORE
N GONORRHOEA RRNA SPEC QL NAA+PROBE: NEGATIVE
SERVICE CMNT-IMP: NORMAL
SPECIMEN SOURCE: ABNORMAL
T VAGINALIS RRNA SPEC QL NAA+PROBE: NEGATIVE

## 2023-02-10 ENCOUNTER — TELEPHONE (OUTPATIENT)
Dept: OBGYN CLINIC | Age: 23
End: 2023-02-10

## 2023-02-10 RX ORDER — METRONIDAZOLE 500 MG/1
500 TABLET ORAL 2 TIMES DAILY
Qty: 14 TABLET | Refills: 0 | Status: SHIPPED | OUTPATIENT
Start: 2023-02-10 | End: 2023-02-17

## 2023-02-10 NOTE — TELEPHONE ENCOUNTER
----- Message from Hawk Zuñiga MD sent at 2/10/2023  8:23 AM EST -----  Labs are abnormal flagyl 500mg BID for 7 days sent and then OTC monostat

## 2023-02-15 ENCOUNTER — TELEPHONE (OUTPATIENT)
Dept: OBGYN CLINIC | Age: 23
End: 2023-02-15

## 2023-02-15 NOTE — TELEPHONE ENCOUNTER
Attempted to call patient to discuss Nuswab results and medications ordered. [employer] message was previously left for patient but it has not been read. Unable to leave message for patient to contact the office as her mailbox was full.

## 2023-02-27 ENCOUNTER — ROUTINE PRENATAL (OUTPATIENT)
Dept: OBGYN CLINIC | Age: 23
End: 2023-02-27

## 2023-02-27 VITALS — WEIGHT: 216.6 LBS | SYSTOLIC BLOOD PRESSURE: 126 MMHG | BODY MASS INDEX: 39.62 KG/M2 | DIASTOLIC BLOOD PRESSURE: 78 MMHG

## 2023-02-27 DIAGNOSIS — Z3A.37 37 WEEKS GESTATION OF PREGNANCY: ICD-10-CM

## 2023-02-27 DIAGNOSIS — O99.013 ANEMIA DURING PREGNANCY IN THIRD TRIMESTER: ICD-10-CM

## 2023-02-27 DIAGNOSIS — Z36.85 ENCOUNTER FOR ANTENATAL SCREENING FOR STREPTOCOCCUS B: ICD-10-CM

## 2023-02-27 DIAGNOSIS — Z13.89 SCREENING FOR GENITOURINARY CONDITION: ICD-10-CM

## 2023-02-27 DIAGNOSIS — Z34.93 PRENATAL CARE, THIRD TRIMESTER: Primary | ICD-10-CM

## 2023-02-27 DIAGNOSIS — O43.123 VELAMENTOUS INSERTION OF UMBILICAL CORD IN THIRD TRIMESTER: ICD-10-CM

## 2023-02-27 LAB
GLUCOSE URINE, POC: NEGATIVE
PROTEIN,URINE, POC: NEGATIVE

## 2023-02-27 NOTE — PROGRESS NOTES
25 y.o.  at 37w4d  who is here for routine OB visit. GBS today. HISTORY:  OB History    Para Term  AB Living   1             SAB IAB Ectopic Molar Multiple Live Births                    # Outcome Date GA Lbr Matty/2nd Weight Sex Delivery Anes PTL Lv   1 Current               Patient's last menstrual period was 2022. Social History     Substance and Sexual Activity   Sexual Activity Yes    Partners: Male      History reviewed. No pertinent past medical history. Past Surgical History:   Procedure Laterality Date    APPENDECTOMY         ROS:  Feeling well. No dyspnea or chest pain on exertion. No abdominal pain, change in bowel habits, black or bloody stools. No urinary tract symptoms. OB ROS: No vaginal bleeding, cxns, LOF, decreased FM. No HA, vision changes, abdominal pain. PHYSICAL EXAM:  /78   Wt 216 lb 9.6 oz (98.2 kg)   LMP 2022   BMI 39.62 kg/m²        ASSESSMENT / PLAN:  1. Prenatal care, third trimester  -     AMB POC OB URINE DIP  2. 37 weeks gestation of pregnancy  Overview:  NIPT-low risk  GTT- failed 134   3hr GTT- passed  COVID-not vaccinated  Flu- given 23  Tdap- given 23  Orders:  -     AMB POC OB URINE DIP  3. Velamentous insertion of umbilical cord in third trimester  Overview:  Check growth approx 32 weeks - normal  4. Anemia during pregnancy in third trimester  Overview:  Hb 10.7 at 30w, recommend oral iron  5. Encounter for  screening for Streptococcus B  -     Culture, Strep B Screen, Vaginal/Rectal; Future  6.  Screening for genitourinary condition  -     AMB POC OB URINE DIP       Scot Glez MD

## 2023-03-03 LAB
BACTERIA SPEC CULT: NORMAL
SERVICE CMNT-IMP: NORMAL

## 2023-03-07 ENCOUNTER — ROUTINE PRENATAL (OUTPATIENT)
Dept: OBGYN CLINIC | Age: 23
End: 2023-03-07
Payer: COMMERCIAL

## 2023-03-07 VITALS — DIASTOLIC BLOOD PRESSURE: 89 MMHG | BODY MASS INDEX: 39.8 KG/M2 | WEIGHT: 217.6 LBS | SYSTOLIC BLOOD PRESSURE: 138 MMHG

## 2023-03-07 DIAGNOSIS — Z13.89 SCREENING FOR GENITOURINARY CONDITION: ICD-10-CM

## 2023-03-07 DIAGNOSIS — O99.013 ANEMIA DURING PREGNANCY IN THIRD TRIMESTER: ICD-10-CM

## 2023-03-07 DIAGNOSIS — O43.123 VELAMENTOUS INSERTION OF UMBILICAL CORD IN THIRD TRIMESTER: ICD-10-CM

## 2023-03-07 DIAGNOSIS — Z34.93 PRENATAL CARE, THIRD TRIMESTER: Primary | ICD-10-CM

## 2023-03-07 DIAGNOSIS — Z3A.38 38 WEEKS GESTATION OF PREGNANCY: ICD-10-CM

## 2023-03-07 LAB
GLUCOSE URINE, POC: NEGATIVE
PROTEIN,URINE, POC: NEGATIVE

## 2023-03-07 PROCEDURE — 99213 OFFICE O/P EST LOW 20 MIN: CPT | Performed by: STUDENT IN AN ORGANIZED HEALTH CARE EDUCATION/TRAINING PROGRAM

## 2023-03-07 NOTE — PROGRESS NOTES
25 y.o.  at 38w5d  who is here for routine OB visit. Cervical check today. 3. Desires IOL, will schedule 3/15/23-3/16/23 for cyto and pit. /89 > 134/76 on repeat, no protein in urine, discussed preE precautions. HISTORY:  OB History    Para Term  AB Living   1             SAB IAB Ectopic Molar Multiple Live Births                    # Outcome Date GA Lbr Matty/2nd Weight Sex Delivery Anes PTL Lv   1 Current               Patient's last menstrual period was 2022. Social History     Substance and Sexual Activity   Sexual Activity Yes    Partners: Male      History reviewed. No pertinent past medical history. Past Surgical History:   Procedure Laterality Date    APPENDECTOMY         ROS:  Feeling well. No dyspnea or chest pain on exertion. No abdominal pain, change in bowel habits, black or bloody stools. No urinary tract symptoms. OB ROS: No vaginal bleeding, cxns, LOF, decreased FM. No HA, vision changes, abdominal pain. PHYSICAL EXAM:  /89   Wt 217 lb 9.6 oz (98.7 kg)   LMP 2022   BMI 39.80 kg/m²        ASSESSMENT / PLAN:  1. Prenatal care, third trimester  -     AMB POC OB URINE DIP  2. 38 weeks gestation of pregnancy  Overview:  NIPT-low risk  GTT- failed 134   3hr GTT- passed  COVID-not vaccinated  Flu- given 23  Tdap- given 23  Orders:  -     AMB POC OB URINE DIP  3. Velamentous insertion of umbilical cord in third trimester  Overview:  Check growth approx 32 weeks - normal  4. Anemia during pregnancy in third trimester  Overview:  Hb 10.7 at 30w, recommend oral iron  5.  Screening for genitourinary condition  -     AMB POC OB URINE DIP         Rahul Anand MD

## 2023-03-15 ENCOUNTER — HOSPITAL ENCOUNTER (INPATIENT)
Age: 23
LOS: 3 days | Discharge: HOME | End: 2023-03-18
Attending: STUDENT IN AN ORGANIZED HEALTH CARE EDUCATION/TRAINING PROGRAM | Admitting: OBSTETRICS & GYNECOLOGY
Payer: COMMERCIAL

## 2023-03-15 PROBLEM — Z37.9 NORMAL LABOR: Status: ACTIVE | Noted: 2023-03-15

## 2023-03-15 LAB
ABO + RH BLD: NORMAL
BLOOD GROUP ANTIBODIES SERPL: NORMAL
ERYTHROCYTE [DISTWIDTH] IN BLOOD BY AUTOMATED COUNT: 14.2 % (ref 11.9–14.6)
HCT VFR BLD AUTO: 37.5 % (ref 35.8–46.3)
HGB BLD-MCNC: 12.1 G/DL (ref 11.7–15.4)
MCH RBC QN AUTO: 26 PG (ref 26.1–32.9)
MCHC RBC AUTO-ENTMCNC: 32.3 G/DL (ref 31.4–35)
MCV RBC AUTO: 80.6 FL (ref 82–102)
NRBC # BLD: 0 K/UL (ref 0–0.2)
PLATELET # BLD AUTO: 261 K/UL (ref 150–450)
PMV BLD AUTO: 11.6 FL (ref 9.4–12.3)
RBC # BLD AUTO: 4.65 M/UL (ref 4.05–5.2)
SPECIMEN EXP DATE BLD: NORMAL
WBC # BLD AUTO: 15.2 K/UL (ref 4.3–11.1)

## 2023-03-15 PROCEDURE — 36415 COLL VENOUS BLD VENIPUNCTURE: CPT

## 2023-03-15 PROCEDURE — 85027 COMPLETE CBC AUTOMATED: CPT

## 2023-03-15 PROCEDURE — 6370000000 HC RX 637 (ALT 250 FOR IP): Performed by: OBSTETRICS & GYNECOLOGY

## 2023-03-15 PROCEDURE — 86900 BLOOD TYPING SEROLOGIC ABO: CPT

## 2023-03-15 PROCEDURE — 1100000000 HC RM PRIVATE

## 2023-03-15 RX ORDER — CARBOPROST TROMETHAMINE 250 UG/ML
250 INJECTION, SOLUTION INTRAMUSCULAR PRN
Status: DISCONTINUED | OUTPATIENT
Start: 2023-03-15 | End: 2023-03-18 | Stop reason: HOSPADM

## 2023-03-15 RX ORDER — SODIUM CHLORIDE, SODIUM LACTATE, POTASSIUM CHLORIDE, AND CALCIUM CHLORIDE .6; .31; .03; .02 G/100ML; G/100ML; G/100ML; G/100ML
1000 INJECTION, SOLUTION INTRAVENOUS PRN
Status: DISCONTINUED | OUTPATIENT
Start: 2023-03-15 | End: 2023-03-18 | Stop reason: HOSPADM

## 2023-03-15 RX ORDER — MISOPROSTOL 200 UG/1
800 TABLET ORAL PRN
Status: DISCONTINUED | OUTPATIENT
Start: 2023-03-15 | End: 2023-03-18 | Stop reason: HOSPADM

## 2023-03-15 RX ORDER — SODIUM CHLORIDE, SODIUM LACTATE, POTASSIUM CHLORIDE, CALCIUM CHLORIDE 600; 310; 30; 20 MG/100ML; MG/100ML; MG/100ML; MG/100ML
INJECTION, SOLUTION INTRAVENOUS CONTINUOUS
Status: DISCONTINUED | OUTPATIENT
Start: 2023-03-15 | End: 2023-03-18 | Stop reason: HOSPADM

## 2023-03-15 RX ORDER — MISOPROSTOL 100 UG/1
50 TABLET ORAL
Status: DISCONTINUED | OUTPATIENT
Start: 2023-03-15 | End: 2023-03-16

## 2023-03-15 RX ORDER — SODIUM CHLORIDE, SODIUM LACTATE, POTASSIUM CHLORIDE, AND CALCIUM CHLORIDE .6; .31; .03; .02 G/100ML; G/100ML; G/100ML; G/100ML
500 INJECTION, SOLUTION INTRAVENOUS PRN
Status: DISCONTINUED | OUTPATIENT
Start: 2023-03-15 | End: 2023-03-18 | Stop reason: HOSPADM

## 2023-03-15 RX ORDER — TRANEXAMIC ACID 10 MG/ML
1000 INJECTION, SOLUTION INTRAVENOUS
Status: ACTIVE | OUTPATIENT
Start: 2023-03-15 | End: 2023-03-16

## 2023-03-15 RX ORDER — METHYLERGONOVINE MALEATE 0.2 MG/ML
200 INJECTION INTRAVENOUS PRN
Status: DISCONTINUED | OUTPATIENT
Start: 2023-03-15 | End: 2023-03-18 | Stop reason: HOSPADM

## 2023-03-15 RX ORDER — DOCUSATE SODIUM 100 MG/1
100 CAPSULE, LIQUID FILLED ORAL 2 TIMES DAILY
Status: DISCONTINUED | OUTPATIENT
Start: 2023-03-15 | End: 2023-03-17

## 2023-03-15 RX ORDER — ONDANSETRON 2 MG/ML
4 INJECTION INTRAMUSCULAR; INTRAVENOUS EVERY 6 HOURS PRN
Status: DISCONTINUED | OUTPATIENT
Start: 2023-03-15 | End: 2023-03-18 | Stop reason: HOSPADM

## 2023-03-15 RX ORDER — FAMOTIDINE 20 MG/1
20 TABLET, FILM COATED ORAL 2 TIMES DAILY
Status: DISCONTINUED | OUTPATIENT
Start: 2023-03-15 | End: 2023-03-18 | Stop reason: HOSPADM

## 2023-03-15 RX ADMIN — Medication 50 MCG: at 18:12

## 2023-03-15 RX ADMIN — Medication 50 MCG: at 22:01

## 2023-03-16 ENCOUNTER — ANESTHESIA EVENT (OUTPATIENT)
Dept: OPERATING ROOM | Age: 23
End: 2023-03-16
Payer: COMMERCIAL

## 2023-03-16 ENCOUNTER — ANESTHESIA (OUTPATIENT)
Dept: OPERATING ROOM | Age: 23
End: 2023-03-16
Payer: COMMERCIAL

## 2023-03-16 LAB
BASE DEFICIT BLD-SCNC: 0.6 MMOL/L
HCO3 BLDV-SCNC: 25 MMOL/L (ref 23–28)
PCO2 BLDCO: 44 MMHG (ref 32–68)
PH BLDCO: 7.37 (ref 7.15–7.38)
PO2 BLDCO: 31 MMHG
SAO2 % BLDV: 56.7 % (ref 65–88)
SERVICE CMNT-IMP: ABNORMAL
SPECIMEN TYPE: ABNORMAL

## 2023-03-16 PROCEDURE — 59514 CESAREAN DELIVERY ONLY: CPT | Performed by: OBSTETRICS & GYNECOLOGY

## 2023-03-16 PROCEDURE — 1100000000 HC RM PRIVATE

## 2023-03-16 PROCEDURE — 3700000025 EPIDURAL BLOCK: Performed by: ANESTHESIOLOGY

## 2023-03-16 PROCEDURE — 7210000100 HC LABOR FEE PER 1 HR

## 2023-03-16 PROCEDURE — 6370000000 HC RX 637 (ALT 250 FOR IP): Performed by: OBSTETRICS & GYNECOLOGY

## 2023-03-16 PROCEDURE — 3609079900 HC CESAREAN SECTION: Performed by: OBSTETRICS & GYNECOLOGY

## 2023-03-16 PROCEDURE — 2580000003 HC RX 258: Performed by: NURSE ANESTHETIST, CERTIFIED REGISTERED

## 2023-03-16 PROCEDURE — 51701 INSERT BLADDER CATHETER: CPT

## 2023-03-16 PROCEDURE — 36600 WITHDRAWAL OF ARTERIAL BLOOD: CPT

## 2023-03-16 PROCEDURE — 6360000002 HC RX W HCPCS: Performed by: ANESTHESIOLOGY

## 2023-03-16 PROCEDURE — 82803 BLOOD GASES ANY COMBINATION: CPT

## 2023-03-16 PROCEDURE — 7100000001 HC PACU RECOVERY - ADDTL 15 MIN: Performed by: OBSTETRICS & GYNECOLOGY

## 2023-03-16 PROCEDURE — 2580000003 HC RX 258: Performed by: OBSTETRICS & GYNECOLOGY

## 2023-03-16 PROCEDURE — 2500000003 HC RX 250 WO HCPCS: Performed by: NURSE ANESTHETIST, CERTIFIED REGISTERED

## 2023-03-16 PROCEDURE — 6360000002 HC RX W HCPCS: Performed by: OBSTETRICS & GYNECOLOGY

## 2023-03-16 PROCEDURE — 3700000000 HC ANESTHESIA ATTENDED CARE: Performed by: OBSTETRICS & GYNECOLOGY

## 2023-03-16 PROCEDURE — 3700000001 HC ADD 15 MINUTES (ANESTHESIA): Performed by: OBSTETRICS & GYNECOLOGY

## 2023-03-16 PROCEDURE — 6370000000 HC RX 637 (ALT 250 FOR IP): Performed by: ANESTHESIOLOGY

## 2023-03-16 PROCEDURE — 7100000000 HC PACU RECOVERY - FIRST 15 MIN: Performed by: OBSTETRICS & GYNECOLOGY

## 2023-03-16 PROCEDURE — 2709999900 HC NON-CHARGEABLE SUPPLY: Performed by: OBSTETRICS & GYNECOLOGY

## 2023-03-16 PROCEDURE — 6360000002 HC RX W HCPCS: Performed by: NURSE ANESTHETIST, CERTIFIED REGISTERED

## 2023-03-16 RX ORDER — SODIUM CHLORIDE 9 MG/ML
INJECTION, SOLUTION INTRAVENOUS PRN
Status: DISCONTINUED | OUTPATIENT
Start: 2023-03-16 | End: 2023-03-16 | Stop reason: HOSPADM

## 2023-03-16 RX ORDER — NALBUPHINE HCL 10 MG/ML
5 AMPUL (ML) INJECTION EVERY 4 HOURS PRN
Status: ACTIVE | OUTPATIENT
Start: 2023-03-16 | End: 2023-03-17

## 2023-03-16 RX ORDER — LIDOCAINE HYDROCHLORIDE 20 MG/ML
INJECTION, SOLUTION EPIDURAL; INFILTRATION; INTRACAUDAL; PERINEURAL PRN
Status: DISCONTINUED | OUTPATIENT
Start: 2023-03-16 | End: 2023-03-16 | Stop reason: SDUPTHER

## 2023-03-16 RX ORDER — SODIUM CHLORIDE, SODIUM LACTATE, POTASSIUM CHLORIDE, CALCIUM CHLORIDE 600; 310; 30; 20 MG/100ML; MG/100ML; MG/100ML; MG/100ML
INJECTION, SOLUTION INTRAVENOUS CONTINUOUS PRN
Status: DISCONTINUED | OUTPATIENT
Start: 2023-03-16 | End: 2023-03-16 | Stop reason: SDUPTHER

## 2023-03-16 RX ORDER — MORPHINE SULFATE 0.5 MG/ML
INJECTION, SOLUTION EPIDURAL; INTRATHECAL; INTRAVENOUS PRN
Status: DISCONTINUED | OUTPATIENT
Start: 2023-03-16 | End: 2023-03-16 | Stop reason: SDUPTHER

## 2023-03-16 RX ORDER — ACETAMINOPHEN 500 MG
1000 TABLET ORAL EVERY 6 HOURS
Status: DISPENSED | OUTPATIENT
Start: 2023-03-16 | End: 2023-03-17

## 2023-03-16 RX ORDER — LIDOCAINE HYDROCHLORIDE AND EPINEPHRINE 20; 5 MG/ML; UG/ML
INJECTION, SOLUTION EPIDURAL; INFILTRATION; INTRACAUDAL; PERINEURAL PRN
Status: DISCONTINUED | OUTPATIENT
Start: 2023-03-16 | End: 2023-03-16 | Stop reason: SDUPTHER

## 2023-03-16 RX ORDER — ONDANSETRON 2 MG/ML
4 INJECTION INTRAMUSCULAR; INTRAVENOUS ONCE
Status: COMPLETED | OUTPATIENT
Start: 2023-03-16 | End: 2023-03-16

## 2023-03-16 RX ORDER — KETOROLAC TROMETHAMINE 30 MG/ML
INJECTION, SOLUTION INTRAMUSCULAR; INTRAVENOUS PRN
Status: DISCONTINUED | OUTPATIENT
Start: 2023-03-16 | End: 2023-03-16 | Stop reason: SDUPTHER

## 2023-03-16 RX ORDER — ROPIVACAINE HYDROCHLORIDE 2 MG/ML
INJECTION, SOLUTION EPIDURAL; INFILTRATION; PERINEURAL CONTINUOUS PRN
Status: DISCONTINUED | OUTPATIENT
Start: 2023-03-16 | End: 2023-03-16 | Stop reason: SDUPTHER

## 2023-03-16 RX ORDER — LIDOCAINE HYDROCHLORIDE 20 MG/ML
INJECTION, SOLUTION EPIDURAL; INFILTRATION; INTRACAUDAL; PERINEURAL PRN
Status: DISCONTINUED | OUTPATIENT
Start: 2023-03-16 | End: 2023-03-16

## 2023-03-16 RX ORDER — ONDANSETRON 2 MG/ML
4 INJECTION INTRAMUSCULAR; INTRAVENOUS EVERY 6 HOURS PRN
Status: ACTIVE | OUTPATIENT
Start: 2023-03-16 | End: 2023-03-17

## 2023-03-16 RX ORDER — TRISODIUM CITRATE DIHYDRATE AND CITRIC ACID MONOHYDRATE 500; 334 MG/5ML; MG/5ML
30 SOLUTION ORAL ONCE
Status: COMPLETED | OUTPATIENT
Start: 2023-03-16 | End: 2023-03-16

## 2023-03-16 RX ORDER — NALOXONE HYDROCHLORIDE 0.4 MG/ML
INJECTION, SOLUTION INTRAMUSCULAR; INTRAVENOUS; SUBCUTANEOUS PRN
Status: ACTIVE | OUTPATIENT
Start: 2023-03-16 | End: 2023-03-17

## 2023-03-16 RX ORDER — SODIUM CHLORIDE 0.9 % (FLUSH) 0.9 %
5-40 SYRINGE (ML) INJECTION EVERY 12 HOURS SCHEDULED
Status: DISCONTINUED | OUTPATIENT
Start: 2023-03-16 | End: 2023-03-16 | Stop reason: HOSPADM

## 2023-03-16 RX ORDER — OXYCODONE HYDROCHLORIDE 5 MG/1
5 TABLET ORAL EVERY 4 HOURS PRN
Status: DISPENSED | OUTPATIENT
Start: 2023-03-16 | End: 2023-03-17

## 2023-03-16 RX ORDER — PROCHLORPERAZINE EDISYLATE 5 MG/ML
5 INJECTION INTRAMUSCULAR; INTRAVENOUS EVERY 4 HOURS PRN
Status: DISCONTINUED | OUTPATIENT
Start: 2023-03-16 | End: 2023-03-18 | Stop reason: HOSPADM

## 2023-03-16 RX ORDER — KETOROLAC TROMETHAMINE 30 MG/ML
30 INJECTION, SOLUTION INTRAMUSCULAR; INTRAVENOUS EVERY 6 HOURS
Status: DISPENSED | OUTPATIENT
Start: 2023-03-16 | End: 2023-03-17

## 2023-03-16 RX ORDER — SODIUM CHLORIDE 0.9 % (FLUSH) 0.9 %
5-40 SYRINGE (ML) INJECTION PRN
Status: DISCONTINUED | OUTPATIENT
Start: 2023-03-16 | End: 2023-03-16 | Stop reason: HOSPADM

## 2023-03-16 RX ORDER — EPHEDRINE SULFATE/0.9% NACL/PF 50 MG/5 ML
SYRINGE (ML) INTRAVENOUS PRN
Status: DISCONTINUED | OUTPATIENT
Start: 2023-03-16 | End: 2023-03-16 | Stop reason: SDUPTHER

## 2023-03-16 RX ORDER — HYDROMORPHONE HYDROCHLORIDE 1 MG/ML
0.5 INJECTION, SOLUTION INTRAMUSCULAR; INTRAVENOUS; SUBCUTANEOUS EVERY 6 HOURS PRN
Status: ACTIVE | OUTPATIENT
Start: 2023-03-16 | End: 2023-03-17

## 2023-03-16 RX ADMIN — PHENYLEPHRINE HYDROCHLORIDE 200 MCG: 0.1 INJECTION, SOLUTION INTRAVENOUS at 15:10

## 2023-03-16 RX ADMIN — Medication 2000 MG: at 14:22

## 2023-03-16 RX ADMIN — PHENYLEPHRINE HYDROCHLORIDE 100 MCG: 0.1 INJECTION, SOLUTION INTRAVENOUS at 15:03

## 2023-03-16 RX ADMIN — LIDOCAINE HYDROCHLORIDE 2 ML: 20 INJECTION, SOLUTION EPIDURAL; INFILTRATION; INTRACAUDAL; PERINEURAL at 07:16

## 2023-03-16 RX ADMIN — SODIUM CHLORIDE, POTASSIUM CHLORIDE, SODIUM LACTATE AND CALCIUM CHLORIDE 500 ML: 600; 310; 30; 20 INJECTION, SOLUTION INTRAVENOUS at 12:24

## 2023-03-16 RX ADMIN — SODIUM CITRATE AND CITRIC ACID MONOHYDRATE 30 ML: 500; 334 SOLUTION ORAL at 14:20

## 2023-03-16 RX ADMIN — PHENYLEPHRINE HYDROCHLORIDE 200 MCG: 0.1 INJECTION, SOLUTION INTRAVENOUS at 15:27

## 2023-03-16 RX ADMIN — Medication 15 MG: at 07:44

## 2023-03-16 RX ADMIN — AZITHROMYCIN MONOHYDRATE 500 MG: 500 INJECTION, POWDER, LYOPHILIZED, FOR SOLUTION INTRAVENOUS at 14:06

## 2023-03-16 RX ADMIN — ROPIVACAINE HYDROCHLORIDE 8 ML/HR: 2 INJECTION, SOLUTION EPIDURAL; INFILTRATION; PERINEURAL at 07:19

## 2023-03-16 RX ADMIN — Medication 2 MILLI-UNITS/MIN: at 11:00

## 2023-03-16 RX ADMIN — MORPHINE SULFATE 2 MG: 0.5 INJECTION, SOLUTION EPIDURAL; INTRATHECAL; INTRAVENOUS at 14:55

## 2023-03-16 RX ADMIN — PHENYLEPHRINE HYDROCHLORIDE 100 MCG: 0.1 INJECTION, SOLUTION INTRAVENOUS at 15:16

## 2023-03-16 RX ADMIN — LIDOCAINE HYDROCHLORIDE AND EPINEPHRINE 2.5 ML: 20; 5 INJECTION, SOLUTION EPIDURAL; INFILTRATION; INTRACAUDAL; PERINEURAL at 14:34

## 2023-03-16 RX ADMIN — KETOROLAC TROMETHAMINE 30 MG: 30 INJECTION, SOLUTION INTRAMUSCULAR at 23:28

## 2023-03-16 RX ADMIN — Medication 50 MCG: at 02:15

## 2023-03-16 RX ADMIN — ROPIVACAINE HYDROCHLORIDE 1 ML: 2 INJECTION, SOLUTION EPIDURAL; INFILTRATION; PERINEURAL at 07:18

## 2023-03-16 RX ADMIN — ACETAMINOPHEN 1000 MG: 500 TABLET, FILM COATED ORAL at 19:22

## 2023-03-16 RX ADMIN — PHENYLEPHRINE HYDROCHLORIDE 100 MCG: 0.1 INJECTION, SOLUTION INTRAVENOUS at 14:58

## 2023-03-16 RX ADMIN — PHENYLEPHRINE HYDROCHLORIDE 100 MCG: 0.1 INJECTION, SOLUTION INTRAVENOUS at 15:05

## 2023-03-16 RX ADMIN — LIDOCAINE HYDROCHLORIDE 2 ML: 20 INJECTION, SOLUTION EPIDURAL; INFILTRATION; INTRACAUDAL; PERINEURAL at 07:18

## 2023-03-16 RX ADMIN — OXYCODONE HYDROCHLORIDE 5 MG: 5 TABLET ORAL at 20:09

## 2023-03-16 RX ADMIN — ROPIVACAINE HYDROCHLORIDE 1 ML: 2 INJECTION, SOLUTION EPIDURAL; INFILTRATION; PERINEURAL at 07:16

## 2023-03-16 RX ADMIN — ONDANSETRON 4 MG: 2 INJECTION INTRAMUSCULAR; INTRAVENOUS at 14:17

## 2023-03-16 RX ADMIN — LIDOCAINE HYDROCHLORIDE AND EPINEPHRINE 5 ML: 20; 5 INJECTION, SOLUTION EPIDURAL; INFILTRATION; INTRACAUDAL; PERINEURAL at 14:31

## 2023-03-16 RX ADMIN — PHENYLEPHRINE HYDROCHLORIDE 100 MCG: 0.1 INJECTION, SOLUTION INTRAVENOUS at 15:22

## 2023-03-16 RX ADMIN — SODIUM CHLORIDE, SODIUM LACTATE, POTASSIUM CHLORIDE, AND CALCIUM CHLORIDE: 600; 310; 30; 20 INJECTION, SOLUTION INTRAVENOUS at 14:29

## 2023-03-16 RX ADMIN — Medication 500 ML/HR: at 14:55

## 2023-03-16 RX ADMIN — LIDOCAINE HYDROCHLORIDE AND EPINEPHRINE 5 ML: 20; 5 INJECTION, SOLUTION EPIDURAL; INFILTRATION; INTRACAUDAL; PERINEURAL at 14:29

## 2023-03-16 RX ADMIN — SODIUM CHLORIDE, SODIUM LACTATE, POTASSIUM CHLORIDE, AND CALCIUM CHLORIDE 1000 ML: 600; 310; 30; 20 INJECTION, SOLUTION INTRAVENOUS at 06:10

## 2023-03-16 RX ADMIN — KETOROLAC TROMETHAMINE 30 MG: 30 INJECTION, SOLUTION INTRAMUSCULAR; INTRAVENOUS at 15:21

## 2023-03-16 ASSESSMENT — PAIN SCALES - GENERAL
PAINLEVEL_OUTOF10: 2
PAINLEVEL_OUTOF10: 5
PAINLEVEL_OUTOF10: 2

## 2023-03-16 ASSESSMENT — PAIN DESCRIPTION - DESCRIPTORS
DESCRIPTORS: BURNING;DISCOMFORT
DESCRIPTORS: CRAMPING

## 2023-03-16 ASSESSMENT — PAIN DESCRIPTION - LOCATION
LOCATION: ABDOMEN
LOCATION: ABDOMEN

## 2023-03-16 ASSESSMENT — PAIN - FUNCTIONAL ASSESSMENT: PAIN_FUNCTIONAL_ASSESSMENT: ACTIVITIES ARE NOT PREVENTED

## 2023-03-16 ASSESSMENT — PAIN DESCRIPTION - FREQUENCY: FREQUENCY: INTERMITTENT

## 2023-03-16 NOTE — PROGRESS NOTES
Called to see pt with decel. Pt has been 5-6cm since this am.  Decel with pitocin. Discussed that her baby is not tolerating labor. Needs to proceed with  with risk of bleeding, infection and damage to nearby organs. Pt is aware and agrees to proceed.

## 2023-03-16 NOTE — ANESTHESIA PRE PROCEDURE
Department of Anesthesiology  Preprocedure Note       Name:  Carmina Bruno   Age:  25 y.o.  :  2000                                          MRN:  [id number]         Date:  3/16/2023      Surgeon: * No surgeons listed *    Procedure: * No procedures listed *    Medications prior to admission:   Prior to Admission medications    Medication Sig Start Date End Date Taking?  Authorizing Provider   ferrous sulfate (IRON 325) 325 (65 Fe) MG tablet Take 325 mg by mouth daily (with breakfast)    Historical Provider, MD   Prenatal-FeFum-FA-DHA w/o A (PRENATAL + DHA PO) Take by mouth    Historical Provider, MD       Current medications:    Current Facility-Administered Medications   Medication Dose Route Frequency Provider Last Rate Last Admin    lactated ringers IV soln infusion   IntraVENous Continuous Zuly Seaman MD        lactated ringers bolus  500 mL IntraVENous PRN Zuly Seaman MD        Or    lactated ringers bolus  1,000 mL IntraVENous PRN Zuly Seaman .9 mL/hr at 23 0610 1,000 mL at 23 0610    methylergonovine (METHERGINE) injection 200 mcg  200 mcg IntraMUSCular PRN Zuly Seaman MD        carboprost [facility]) injection 250 mcg  250 mcg IntraMUSCular PRN Zuly Seaman MD        miSOPROStol (CYTOTEC) tablet 800 mcg  800 mcg Rectal PRN Zuly Seaman MD        tranexamic acid-NaCl IVPB premix 1,000 mg  1,000 mg IntraVENous Once PRN Zuly Seaman MD        oxytocin (PITOCIN) 30 units in 500 mL infusion  87.3 franki-units/min IntraVENous Continuous PRN Zuly Seaman MD        And    oxytocin (PITOCIN) 10 unit bolus from the bag  10 Units IntraVENous PRN Zuly Seaman MD        ondansetron [facility]) injection 4 mg  4 mg IntraVENous Q6H PRN Zuly Seaman MD        docusate sodium (COLACE) capsule 100 mg  100 mg Oral BID Zuly Seaman MD        butorphanol (STADOL) injection 1 mg  1 mg IntraVENous Q3H PRN Alvarado King MD        famotidine (PEPCID) tablet 20 mg  20 mg Oral BID Alvarado King MD        miSOPROStol (CYTOTEC) pre-split tablet TABS 50 mcg  50 mcg Buccal 6 times per day Alvarado King MD   50 mcg at 03/16/23 0215     Facility-Administered Medications Ordered in Other Encounters   Medication Dose Route Frequency Provider Last Rate Last Admin    ropivacaine (NAROPIN) 0.2% injection 0.2%   Epidural Continuous PRN Lyric Chan APRN - CRNA 8 mL/hr at 03/16/23 0719 8 mL/hr at 03/16/23 0719    ePHEDrine injection   IntraVENous PRN Lyric Chan APRN - CRNA   15 mg at 03/16/23 0744       Allergies:  No Known Allergies    Problem List:    Patient Active Problem List   Diagnosis Code    Pregnancy Z34.90    Maternal varicella, non-immune O09.899, Z28.39    Velamentous insertion of umbilical cord B35.292    Anemia during pregnancy in third trimester O99.013    Normal labor O80, Z37.9       Past Medical History:  No past medical history on file. Past Surgical History:        Procedure Laterality Date    APPENDECTOMY         Social History:    Social History     Tobacco Use    Smoking status: Never    Smokeless tobacco: Never   Substance Use Topics    Alcohol use:  No                                Counseling given: Not Answered      Vital Signs (Current):   Vitals:    03/16/23 0741 03/16/23 0745 03/16/23 0747 03/16/23 0748   BP: (!) 114/54 (!) 120/55 137/80 134/75   Pulse: 87 85 77 80   Resp:       Temp:       TempSrc:       SpO2:                                                  BP Readings from Last 3 Encounters:   03/16/23 134/75   03/07/23 138/89   02/27/23 126/78       NPO Status:                                                                                 BMI:   Wt Readings from Last 3 Encounters:   03/07/23 217 lb 9.6 oz (98.7 kg)   02/27/23 216 lb 9.6 oz (98.2 kg)   02/06/23 204 lb 12.8 oz (92.9 kg)     There is no height or weight on file to calculate BMI.    CBC:   Lab Results   Component Value Date/Time    WBC 15.2 03/15/2023 05:59 PM    RBC 4.65 03/15/2023 05:59 PM    HGB 12.1 03/15/2023 05:59 PM    HCT 37.5 03/15/2023 05:59 PM    MCV 80.6 03/15/2023 05:59 PM    RDW 14.2 03/15/2023 05:59 PM     03/15/2023 05:59 PM       CMP:   Lab Results   Component Value Date/Time     10/15/2021 12:26 PM    K 3.7 10/15/2021 12:26 PM     10/15/2021 12:26 PM    CO2 24 10/15/2021 12:26 PM    BUN 9 10/15/2021 12:26 PM    CREATININE 0.85 10/15/2021 12:26 PM    GFRAA >60 10/15/2021 12:26 PM    AGRATIO 1.0 10/15/2021 12:26 PM    GLUCOSE 77 10/15/2021 12:26 PM    PROT 8.5 10/15/2021 12:26 PM    CALCIUM 9.4 10/15/2021 12:26 PM    BILITOT 0.6 10/15/2021 12:26 PM    ALKPHOS 78 10/15/2021 12:26 PM    AST 23 10/15/2021 12:26 PM    ALT 30 10/15/2021 12:26 PM       POC Tests: No results for input(s): POCGLU, POCNA, POCK, POCCL, POCBUN, POCHEMO, POCHCT in the last 72 hours.    Coags: No results found for: PROTIME, INR, APTT    HCG (If Applicable): No results found for: PREGTESTUR, PREGSERUM, HCG, HCGQUANT     ABGs: No results found for: PHART, PO2ART, ZMK8JVG, VTI1VYB, BEART, O4MJANYB     Type & Screen (If Applicable):  No results found for: LABABO, LABRH    Drug/Infectious Status (If Applicable):  Lab Results   Component Value Date/Time    HEPCAB NONREACTIVE 09/08/2022 09:29 AM       COVID-19 Screening (If Applicable): No results found for: COVID19        Anesthesia Evaluation  Patient summary reviewed and Nursing notes reviewed no history of anesthetic complications:   Airway: Mallampati: III          Dental: normal exam         Pulmonary:Negative Pulmonary ROS breath sounds clear to auscultation                             Cardiovascular:Negative CV ROS  Exercise tolerance: good (>4 METS),                     Neuro/Psych:   Negative Neuro/Psych ROS              GI/Hepatic/Renal:   (+) morbid obesity          Endo/Other: Negative Endo/Other ROS                    Abdominal:         Vascular: negative vascular ROS. Other Findings:           Anesthesia Plan      epidural     ASA 3             Anesthetic plan and risks discussed with patient and spouse.                         Tennille Farley MD   3/16/2023

## 2023-03-16 NOTE — H&P
Subjective:     Sydnee Flores, MRN: [id number], is a 25 y.o.  female presents with TIUP for IOL. unchanged course. See office notes on prenatal care. Patient Active Problem List    Diagnosis Date Noted    Normal labor 03/15/2023    Maternal varicella, non-immune 09/14/2022    Anemia during pregnancy in third trimester 01/06/2023    Velamentous insertion of umbilical cord 81/84/9130    Pregnancy 09/08/2022     No past medical history on file. Past Surgical History:   Procedure Laterality Date    APPENDECTOMY        Medications Prior to Admission: ferrous sulfate (IRON 325) 325 (65 Fe) MG tablet, Take 325 mg by mouth daily (with breakfast)  Prenatal-FeFum-FA-DHA w/o A (PRENATAL + DHA PO), Take by mouth  No Known Allergies   Social History     Tobacco Use    Smoking status: Never    Smokeless tobacco: Never   Substance Use Topics    Alcohol use: No      No family history on file.      Prenatal Labs: No components found for: OBEXTABORH, OBEXTABSCRN, OBEXTRUBELLA, OBEXTGRBS, OBEXTHBSAG, OBEXTHIV, OBEXTRPR, OBEXTGONORR, OBEXTCHLAM     Review of Systems  Constitutional: negative  Respiratory: negative  Cardiovascular: negative  Musculoskeletal:negative    Objective:     Patient Vitals for the past 8 hrs:   BP Temp Temp src Pulse Resp SpO2   03/16/23 0723 (!) 121/55 -- -- (!) 109 -- --   03/16/23 0722 133/62 -- -- (!) 111 -- --   03/16/23 0721 136/68 -- -- (!) 112 -- --   03/16/23 0719 (!) 141/67 -- -- (!) 112 -- --   03/16/23 0718 (!) 170/79 -- -- (!) 115 -- --   03/16/23 0716 (!) 167/88 -- -- (!) 111 -- --   03/16/23 0714 (!) 168/85 -- -- (!) 115 -- --   03/16/23 0713 (!) 157/83 -- -- (!) 110 -- --   03/16/23 0712 (!) 166/91 -- -- (!) 108 -- --   03/16/23 0220 139/83 -- -- 93 -- --   03/16/23 0126 (!) 108/59 98.7 °F (37.1 °C) Oral 70 18 98 %   03/16/23 0026 113/62 -- -- 84 -- --     No intake or output data in the 24 hours ending 03/16/23 0731  BP (!) 121/55   Pulse (!) 109   Temp 98.7 °F (37.1 °C) (Oral)   Resp 18   LMP 06/09/2022   SpO2 98%   General appearance: alert, appears stated age, cooperative, and no distress  Head: Normocephalic, without obvious abnormality, atraumatic  Back: symmetric, no curvature. ROM normal. No CVA tenderness. Lungs: clear to auscultation bilaterally  Heart: regular rate and rhythm, S1, S2 normal, no murmur, click, rub or gallop  Abdomen:  gravid at term  Pelvic: external genitalia normal, vagina normal without discharge, cervix 3-4 cm  Extremities: extremities normal, atraumatic, no cyanosis or edema  Pulses: 2+ and symmetric  Skin: Skin color, texture, turgor normal. No rashes or lesions      Assessment:     Principal Problem:    Normal labor  Resolved Problems:    * No resolved hospital problems. *      For IOL at term, due date today All questions answered, will proceed.     TIUP , beta neg  Plan:         TIUP, ALEXUS, AROM, exp mgt

## 2023-03-16 NOTE — PROGRESS NOTES
Primary RN Sarabjit Galdamez, called out for someone to call Dr Lizz Sandhu and let her know about prolonged decel x2. This RN called MD and OR nurse answered her phone. MD asked about variability and was told that variability is good before and after decels. MD is in a case in the OR and will be up as soon as she is done. Primary RN made aware.

## 2023-03-16 NOTE — L&D DELIVERY NOTE
Mother's Information      Labor Events     Labor?: No  Cervical Ripening:   Now               Chris, Baby Girl Sandy Gay [[id number]]      Labor Events     Labor?: No   Steroids?: None  Cervical Ripening Date/Time:     Cervical Ripening Type: Misoprostol  Rupture Date/Time:     Rupture Type: AROM  Fluid Color: Clear  Fluid Volume: Moderate  Induction: AROM, Misoprostol, Oxytocin  Augmentation: None  Labor Complications: Fetal Intolerance, Failure to Progress in First Stage       Anesthesia    Method: Epidural       Start Pushing      Labor onset date/time:   Now     Dilation complete date/time:   Now     Start pushing date/time:    Decision date/time (emergent ):           Labor Length    3rd stage: 0h 04m       Delivery ()      Delivery Date/Time:  3/16/23 14:52:00   Delivery Type: , Low Transverse    Details:  Trial of Labor?: Yes    Categorization: Primary    Priority: Non-scheduled   Indications for : Failure to Progress, Fetal Intolerance of Labor   Skin Incision Type: Low Transverse     Uterine Incision: Low Transverse             [place] Presentation    Presentation: Vertex  Position: Middle  _: Occiput  _: Posterior       Shoulder Dystocia    Shoulder Dystocia Present?: No  Add Second Maneuver  Add Third Maneuver  Add Fourth Maneuver  Add Fifth Maneuver  Add Sixth Maneuver  Add Seventh Maneuver  Add Eighth Maneuver  Add Ninth Maneuver       Assisted Delivery Details    Forceps Attempted?: No  Vacuum Extractor Attempted?: No       Document Additional Attempt         Document Additional Attempt                 Cord    Vessels: 3 Vessels  Complications: None  Delayed Cord Clamping?: Yes  Cord Blood Disposition: Lab  Gases Sent?: Yes       Placenta    Date/Time: 3/16/2023 14:56:42  Removal: Expressed  Appearance:  Adherent  Disposition: Discarded       Lacerations    Episiotomy: None       Vaginal Counts    Initial Count Personnel: N/A  Initial Count Verified By: N/A    Sponges [place] [employer]   Initial Counts      Final Counts      Final Count Personnel: N/A  Final Count Verified By: N/A  If the count is incorrect due to Intentionally Retained Foreign Object (IRFO) add the IRFO LDA in Lines/Drains. Add LDA: Link to [facility]       Blood Loss  Mother: Clive Hale #[id number]     Start of Mother's Information      Delivery Blood Loss  23 1434 - 23 1534      None                 End of Mother's Information  Mother: Clive Hale #[id number]                Delivery Providers    Delivering clinician: Tyseha Chester,      Provider Role    Tyesha Chester DO Obstetrician    Edmar Belle, RN Primary Nurse    Arthur Carrera, RN Primary  Nurse    Gabriela Rico MD Neonatologist    Jacob Prieto MD Anesthesiologist    Ulices Jimenez, APRN - CRNA Nurse Anesthetist    Dilcia Ventura, RCP Respiratory Therapist    Randee Stiles Scrub Tech    Dossiirlanda Marianela Scrub Tech               Assessment    Living Status: Living  Delivery Location Comment: OR2     Apgar Scoring Key:    0 1 2    Skin Color: Blue or pale Acrocyanotic Completely pink    Heart Rate: Absent <100 bpm >100 bpm    Reflex Irritability: No response Grimace Cry or active withdrawal    Muscle Tone: Limp Some flexion Active motion    Respiratory Effort: Absent Weak cry; hypoventilation Good, crying                      Skin Color:   Heart Rate:   Reflex Irritability:   Muscle Tone:   Respiratory Effort: Total:            1 Minute:    1    2    2    2    2    9        Apgar 1 total from OB History    5 Minute:    1    2    2    2    2    9        Apgar 5 total from OB History    10 Minute:              15 Minute:              20 Minute:                        Apgars Assigned By: DR. DUMONT              Resuscitation    Method: Bulb Suction, Stimulation              Measurements      Birth Weight: 3430 g Birth Length: 0.51 m     Head Circumference: 0.345 m Chest Circumference: 0.34 m              Title      Skin to Skin Initiation Date/Time:       Skin to Skin End Date/Time:                   Section Delivery Operative Report    Date of Surgery: 23      Preoperative Diagnosis: fetal intolerance, arrest of dilation     Postoperative Diagnosis: Same as above with delivery of viable female weight 7#9oz. Apgars Phiper@MinuteKey and 9@5    Procedure: Procedure(s):   SECTION    Surgeon(s) and Role:     * Rachelle Merida, DO - Primary     Anesthesia: Spinal    Findings: Grossly normal uterus, tubes, and ovaries. Intravenous Fluids: 800cc     Urine Output: 200cc clear yellow     Estimated Blood Loss:      See summary   Complications: none     Specimens: * No specimens in log *      Procedure Detail:      The patient was taken to the operating room, where anesthesia was found to be adequate. The patient was prepped and draped in the normal sterile fashion. Pfannenstiel skin incision was made with the scalpel and carried down to the underlying fascia. The fascial incision was extended laterally with Valenzuela scissors. This fascial incision was grasped with Kocher clamps, tented up, and the underlying rectus muscles were dissected sharply. The inferior edge of the rectus fascia was grasped with Kocher clamps, tented up, and the underlying rectus muscle was dissected off sharply. The rectus muscle was divided in the midline bluntly. The peritoneum was entered bluntly and extended inferiorly and superiorly with Metzenbaum scissors. The bladder blade was then inserted. The vesicouterine peritoneum was identified, grasped with pick-ups and entered sharply with Metzenbaum scissors. The bladder flap was then created digitally and the bladder blade was reinserted. A low transverse uterine incision was made with the scalpel and extended superiorly inferiorly with blunt finger dissection. The babys head was then delivered atraumatically. The nose and mouth were suctioned. Shoulders and body were delivered with gentle fundal pressure. Delayed cord clamping practiced. The cord was clamped and cut and the baby was handed off to the waiting  care unit staff. Placenta was then expressed. The uterus was exteriorized and cleared of all clots and debris. The uterine incision was closed in single layer with running locked layer of 0 Chromic. Pt had some bleeding along the right corner that was made hemostatic with figure of 8 suture x 2. The posterior cul-de-sac was washed with warm normal saline. Good hemostasis was again reassured. The uterus was returned to the abdominal cavity. The paracolic gutters were washed with warm normal saline. Good hemostasis was again reassured throughout. The peritoneum was closed with 2-0 Vicryl in a running fashion and the rectus muscles reapproximated in the midline using 0-chromic. The fascia was closed with stratafix  in a running fashion. Good hemostasis was assured. The subcuticular layers were reapproximated with 2-0 plain gut in running fashion. The skin was closed with a 4-0 monocryl in a subcuticular fashion. The patient tolerated the procedure well. Sponge, lap, and needle counts were correct times two and the patient was taken to recovery in stable condition.     Signed By: Yenifer Melo DO     2023

## 2023-03-16 NOTE — PROGRESS NOTES
Bertina Field Dr Jannetta Severs at bedside at 8039. MAUREEN Lin at bedside at 1990 [facility] pt to sitting up on bedside at 0700. Timeout completed at 46 with MD, MAUREEN and myself at bedside. Test dose given at 0711. Negative reaction. Dose given at 0716. Pt assisted to lying back in left tilt position. See anesthesia record for details. See vital sign flow sheet for BP. Tolerated procedure well.

## 2023-03-16 NOTE — ANESTHESIA PROCEDURE NOTES
Epidural Block    Patient location during procedure: OB  Start time: 3/16/2023 7:01 AM  End time: 3/16/2023 7:15 AM  Reason for block: labor epidural  Staffing  Performed: anesthesiologist   Anesthesiologist: Jacob Prieto MD  Epidural  Patient position: sitting  Prep: ChloraPrep  Patient monitoring: continuous pulse ox  Approach: midline  Location: L3-4  Injection technique: BRYAN air  Provider prep: mask and sterile gloves  Needle  Needle type: Tuohy   Needle gauge: 17 G  Needle length: 3.5 in  Needle insertion depth: 9 cm  Catheter type: end hole  Catheter size: 19 G  Catheter at skin depth: 15.5 cm  Test dose: negativeCatheter Secured: tegaderm and tape  Assessment  Hemodynamics: stable  Attempts: 1  Outcomes: uncomplicated and patient tolerated procedure well  Preanesthetic Checklist  Completed: patient identified, IV checked, risks and benefits discussed, surgical/procedural consents, equipment checked, pre-op evaluation, timeout performed, anesthesia consent given, oxygen available and monitors applied/VS acknowledged

## 2023-03-17 ENCOUNTER — ANESTHESIA EVENT (OUTPATIENT)
Dept: MOTHER INFANT UNIT | Age: 23
End: 2023-03-17

## 2023-03-17 ENCOUNTER — ANESTHESIA (OUTPATIENT)
Dept: MOTHER INFANT UNIT | Age: 23
End: 2023-03-17

## 2023-03-17 LAB
BASE DEFICIT BLD-SCNC: 1.5 MMOL/L
ERYTHROCYTE [DISTWIDTH] IN BLOOD BY AUTOMATED COUNT: 14.2 % (ref 11.9–14.6)
HCO3 BLD-SCNC: 25.9 MMOL/L (ref 22–26)
HCT VFR BLD AUTO: 27.4 % (ref 35.8–46.3)
HGB BLD-MCNC: 8.8 G/DL (ref 11.7–15.4)
MCH RBC QN AUTO: 26.3 PG (ref 26.1–32.9)
MCHC RBC AUTO-ENTMCNC: 32.1 G/DL (ref 31.4–35)
MCV RBC AUTO: 81.8 FL (ref 82–102)
NRBC # BLD: 0 K/UL (ref 0–0.2)
PCO2 BLDCO: 53 MMHG (ref 32–68)
PH BLDCO: 7.3 (ref 7.15–7.38)
PLATELET # BLD AUTO: 202 K/UL (ref 150–450)
PMV BLD AUTO: 10.3 FL (ref 9.4–12.3)
PO2 BLDCO: 20 MMHG
RBC # BLD AUTO: 3.35 M/UL (ref 4.05–5.2)
SAO2 % BLD: 25.2 % (ref 95–98)
SERVICE CMNT-IMP: ABNORMAL
SPECIMEN TYPE: ABNORMAL
WBC # BLD AUTO: 17.7 K/UL (ref 4.3–11.1)

## 2023-03-17 PROCEDURE — 6370000000 HC RX 637 (ALT 250 FOR IP): Performed by: OBSTETRICS & GYNECOLOGY

## 2023-03-17 PROCEDURE — 6360000002 HC RX W HCPCS: Performed by: ANESTHESIOLOGY

## 2023-03-17 PROCEDURE — 6370000000 HC RX 637 (ALT 250 FOR IP): Performed by: ANESTHESIOLOGY

## 2023-03-17 PROCEDURE — 85027 COMPLETE CBC AUTOMATED: CPT

## 2023-03-17 PROCEDURE — 1100000000 HC RM PRIVATE

## 2023-03-17 PROCEDURE — 36415 COLL VENOUS BLD VENIPUNCTURE: CPT

## 2023-03-17 RX ORDER — DOCUSATE SODIUM 100 MG/1
100 CAPSULE, LIQUID FILLED ORAL 2 TIMES DAILY
Status: DISCONTINUED | OUTPATIENT
Start: 2023-03-17 | End: 2023-03-18 | Stop reason: HOSPADM

## 2023-03-17 RX ORDER — OXYCODONE HYDROCHLORIDE 5 MG/1
5 TABLET ORAL EVERY 4 HOURS PRN
Status: DISCONTINUED | OUTPATIENT
Start: 2023-03-17 | End: 2023-03-18 | Stop reason: HOSPADM

## 2023-03-17 RX ORDER — SODIUM CHLORIDE, SODIUM LACTATE, POTASSIUM CHLORIDE, CALCIUM CHLORIDE 600; 310; 30; 20 MG/100ML; MG/100ML; MG/100ML; MG/100ML
INJECTION, SOLUTION INTRAVENOUS CONTINUOUS
Status: DISCONTINUED | OUTPATIENT
Start: 2023-03-17 | End: 2023-03-18 | Stop reason: HOSPADM

## 2023-03-17 RX ORDER — IBUPROFEN 600 MG/1
600 TABLET ORAL EVERY 8 HOURS SCHEDULED
Status: DISCONTINUED | OUTPATIENT
Start: 2023-03-17 | End: 2023-03-18 | Stop reason: HOSPADM

## 2023-03-17 RX ORDER — SODIUM CHLORIDE 9 MG/ML
INJECTION, SOLUTION INTRAVENOUS PRN
Status: DISCONTINUED | OUTPATIENT
Start: 2023-03-17 | End: 2023-03-18 | Stop reason: HOSPADM

## 2023-03-17 RX ORDER — SODIUM CHLORIDE 0.9 % (FLUSH) 0.9 %
5-40 SYRINGE (ML) INJECTION PRN
Status: DISCONTINUED | OUTPATIENT
Start: 2023-03-17 | End: 2023-03-18 | Stop reason: HOSPADM

## 2023-03-17 RX ORDER — SODIUM CHLORIDE 0.9 % (FLUSH) 0.9 %
5-40 SYRINGE (ML) INJECTION EVERY 12 HOURS SCHEDULED
Status: DISCONTINUED | OUTPATIENT
Start: 2023-03-17 | End: 2023-03-18 | Stop reason: HOSPADM

## 2023-03-17 RX ORDER — LANOLIN
CREAM (ML) TOPICAL
Status: DISCONTINUED | OUTPATIENT
Start: 2023-03-17 | End: 2023-03-18 | Stop reason: HOSPADM

## 2023-03-17 RX ORDER — ACETAMINOPHEN 500 MG
1000 TABLET ORAL EVERY 8 HOURS PRN
Status: DISCONTINUED | OUTPATIENT
Start: 2023-03-17 | End: 2023-03-18 | Stop reason: HOSPADM

## 2023-03-17 RX ADMIN — OXYCODONE HYDROCHLORIDE 5 MG: 5 TABLET ORAL at 14:21

## 2023-03-17 RX ADMIN — OXYCODONE HYDROCHLORIDE 5 MG: 5 TABLET ORAL at 04:34

## 2023-03-17 RX ADMIN — KETOROLAC TROMETHAMINE 30 MG: 30 INJECTION, SOLUTION INTRAMUSCULAR at 16:16

## 2023-03-17 RX ADMIN — DOCUSATE SODIUM 100 MG: 100 CAPSULE ORAL at 19:35

## 2023-03-17 RX ADMIN — KETOROLAC TROMETHAMINE 30 MG: 30 INJECTION, SOLUTION INTRAMUSCULAR at 09:36

## 2023-03-17 RX ADMIN — FAMOTIDINE 20 MG: 20 TABLET, FILM COATED ORAL at 09:36

## 2023-03-17 RX ADMIN — ACETAMINOPHEN 1000 MG: 500 TABLET, FILM COATED ORAL at 21:00

## 2023-03-17 RX ADMIN — DOCUSATE SODIUM 100 MG: 100 CAPSULE ORAL at 09:36

## 2023-03-17 RX ADMIN — ACETAMINOPHEN 1000 MG: 500 TABLET, FILM COATED ORAL at 04:34

## 2023-03-17 RX ADMIN — IBUPROFEN 600 MG: 600 TABLET, FILM COATED ORAL at 23:47

## 2023-03-17 RX ADMIN — OXYCODONE HYDROCHLORIDE 5 MG: 5 TABLET ORAL at 19:34

## 2023-03-17 RX ADMIN — OXYCODONE HYDROCHLORIDE 5 MG: 5 TABLET ORAL at 10:20

## 2023-03-17 RX ADMIN — ACETAMINOPHEN 1000 MG: 500 TABLET, FILM COATED ORAL at 12:31

## 2023-03-17 ASSESSMENT — PAIN - FUNCTIONAL ASSESSMENT
PAIN_FUNCTIONAL_ASSESSMENT: ACTIVITIES ARE NOT PREVENTED

## 2023-03-17 ASSESSMENT — PAIN DESCRIPTION - LOCATION
LOCATION: ABDOMEN
LOCATION: ABDOMEN;INCISION

## 2023-03-17 ASSESSMENT — PAIN DESCRIPTION - DESCRIPTORS
DESCRIPTORS: SORE
DESCRIPTORS: BURNING
DESCRIPTORS: BURNING;DISCOMFORT
DESCRIPTORS: SORE
DESCRIPTORS: DISCOMFORT;PRESSURE

## 2023-03-17 ASSESSMENT — PAIN SCALES - GENERAL
PAINLEVEL_OUTOF10: 5
PAINLEVEL_OUTOF10: 3
PAINLEVEL_OUTOF10: 4
PAINLEVEL_OUTOF10: 7
PAINLEVEL_OUTOF10: 3

## 2023-03-17 ASSESSMENT — PAIN DESCRIPTION - ORIENTATION
ORIENTATION: LOWER
ORIENTATION: LOWER

## 2023-03-17 NOTE — CARE COORDINATION
Chart reviewed - first time parent. SW met with patient to complete initial assessment.  provided education on [facility] Visit [facility].  Family was undecided on need for home visit. No referral will be made at this time. Family has this 's contact information should they decide to participate in program.    Patient given informational packet on  mood & anxiety disorders (resources/education). Family denies any additional needs from  at this time. Family has 's contact information should any needs/questions arise.     NAIF Mar, [address] [facility]   [phone]

## 2023-03-17 NOTE — PROGRESS NOTES
Anesthesiology  Post-op Note    Post-op day 1 s/p  via epidural with neuraxial opioids for post-op pain management. /78   Pulse 96   Temp 97.9 °F (36.6 °C) (Oral)   Resp 16   LMP 2022   SpO2 97%   Breastfeeding Unknown   Airway patent, patient appropriately hydrated and appears euvolemic. Patient is Alert and oriented. Pain is well controlled. Pruritus is absent. Nausea is absent. No complaints about back or site of injection. Motor and sensory function has returned to baseline in lower extremities. Patient is satisfied with anesthetic and reports no complications. Continue current orders, then initiate surgeon's orders for pain management 24 hours after . Follow up per surgeon.

## 2023-03-17 NOTE — PROGRESS NOTES
Patient up to bathroom with minimal assistance. Sue-care taught and completed. Questions encouraged and answered. Patient back to bed, encouraged to call for needs or concerns. Verbalizes understanding.

## 2023-03-17 NOTE — ANESTHESIA POSTPROCEDURE EVALUATION
Department of Anesthesiology  Postprocedure Note    Patient: Poppy Perez  MRN: [id number]  YOB: 2000  Date of evaluation: 3/16/2023      Procedure Summary     Date: 23 [facility] / [facility]  [facility]    Anesthesia Start: 655 Anesthesia Stop: 1542    Procedures:        SECTION (Abdomen)      Labor Analgesia Diagnosis:       S/P primary low transverse       (fetal intolerance)    Surgeons: Rachelle Merida DO Responsible Provider: Dewayne Barlow MD    Anesthesia Type: epidural ASA Status: 3          Anesthesia Type: No value filed.    Cher Phase I:      Cher Phase II:        Anesthesia Post Evaluation    Patient location during evaluation: bedside  Patient participation: complete - patient participated  Level of consciousness: awake and alert  Airway patency: patent  Nausea & Vomiting: no vomiting and no nausea  Complications: no  Cardiovascular status: hemodynamically stable  Respiratory status: nonlabored ventilation and spontaneous ventilation  Hydration status: euvolemic  Comments: Lower extremity neurological function returned to baseline per patient  Multimodal analgesia pain management approach

## 2023-03-17 NOTE — PROGRESS NOTES
Post-Partum Day Number 1 Progress Note    Patient doing well  without significant complaints. Pain controlled on current medication. Voiding without difficulty, normal lochia. Vitals:  /64   Pulse 90   Temp 97.8 °F (36.6 °C) (Oral)   Resp 16   LMP 2022   SpO2 96%   Breastfeeding Unknown     Vital signs stable, afebrile. Exam:  Patient without distress. Heart rrr  Lungs cta b&s               Abdomen soft, fundus firm at level of umbilicus, nontender. Incision clean, dry and intact. Lower extremities are negative for swelling, cords or tenderness. No results found for: [facility]     Lab Results   Component Value Date/Time    ABORH A POSITIVE 03/15/2023 05:59 PM     Lab Results   Component Value Date/Time    HGB 8.8 2023 05:41 AM         Assessment and Plan:  Patient appears to be having uncomplicated post-partum course. Continue routine post-delivery care and maternal education. Breastfeeding. Routine pp care. Acute blood loss anemia - appropriate for . Asymptomatic. Will send home on po iron.

## 2023-03-17 NOTE — LACTATION NOTE
This note was copied from a baby's chart. Lactation visit. First time parents. Mom intends to pump and bottle feed, no latching. Has been exclusively bottle feeding formula so far. LC in to assist with mom pumping. Hospital pump set up, reviewed settings, fit of flange and hands on pumping, mom pumped 12ml total. Good volume. Reviewed normal colostrum volume expectations. Pump every 3 hours. Feed pumped milk plus formula as needed each feeding. Feed baby every 3 hours. Mom has personal pump at home but unsure what type- will bring in for instruction prior to discharge. All questions answered. Reviewed pump part cleaning instructions.

## 2023-03-18 VITALS
HEART RATE: 93 BPM | DIASTOLIC BLOOD PRESSURE: 68 MMHG | RESPIRATION RATE: 16 BRPM | OXYGEN SATURATION: 99 % | SYSTOLIC BLOOD PRESSURE: 128 MMHG | TEMPERATURE: 97.7 F

## 2023-03-18 PROCEDURE — 6370000000 HC RX 637 (ALT 250 FOR IP): Performed by: OBSTETRICS & GYNECOLOGY

## 2023-03-18 RX ORDER — IBUPROFEN 600 MG/1
600 TABLET ORAL EVERY 8 HOURS PRN
Qty: 40 TABLET | Refills: 0 | Status: SHIPPED | OUTPATIENT
Start: 2023-03-18

## 2023-03-18 RX ORDER — OXYCODONE HYDROCHLORIDE 5 MG/1
5 TABLET ORAL EVERY 6 HOURS PRN
Qty: 20 TABLET | Refills: 0 | Status: SHIPPED | OUTPATIENT
Start: 2023-03-18 | End: 2023-03-23

## 2023-03-18 RX ADMIN — OXYCODONE HYDROCHLORIDE 5 MG: 5 TABLET ORAL at 02:27

## 2023-03-18 RX ADMIN — ACETAMINOPHEN 1000 MG: 500 TABLET, FILM COATED ORAL at 06:03

## 2023-03-18 RX ADMIN — DOCUSATE SODIUM 100 MG: 100 CAPSULE ORAL at 08:50

## 2023-03-18 RX ADMIN — OXYCODONE HYDROCHLORIDE 5 MG: 5 TABLET ORAL at 08:50

## 2023-03-18 RX ADMIN — IBUPROFEN 600 MG: 600 TABLET, FILM COATED ORAL at 08:50

## 2023-03-18 ASSESSMENT — PAIN DESCRIPTION - DESCRIPTORS
DESCRIPTORS: DISCOMFORT
DESCRIPTORS: DISCOMFORT

## 2023-03-18 ASSESSMENT — PAIN DESCRIPTION - LOCATION
LOCATION: ABDOMEN;INCISION
LOCATION: ABDOMEN

## 2023-03-18 ASSESSMENT — PAIN - FUNCTIONAL ASSESSMENT
PAIN_FUNCTIONAL_ASSESSMENT: ACTIVITIES ARE NOT PREVENTED
PAIN_FUNCTIONAL_ASSESSMENT: ACTIVITIES ARE NOT PREVENTED

## 2023-03-18 ASSESSMENT — PAIN SCALES - GENERAL
PAINLEVEL_OUTOF10: 4
PAINLEVEL_OUTOF10: 5

## 2023-03-18 NOTE — DISCHARGE INSTRUCTIONS
Discharge instruction to follow: Activity: Pelvis rest for 6 weeks     No heavy lifting over 15 lbs for 2 weeks     No driving for 2 weeks     No push/pull motion such as sweeping or vacuuming for 2 weeks     No tub baths for 6 weeks     section keep incision clean and dry, may shower as normal with soap and water. Inspect incision every day for signs of infection listed below. Continue to use jackson-bottle with every void or bowel movement until comfortable stopping. Change sanitary pad after each urination or bowel movement. Call MD for the following:      Fever over 101 F; pain not relieved by medication; foul smelling vaginal discharge or increase in vaginal bleeding. Redness, swelling, or drainage from  incision. Take medication as prescribed. Follow up with MD as order.  Section: What to Expect at [address] [place] [address]     A  section, or , is surgery to deliver your baby through a cut that the doctor makes in your lower belly and uterus. The cut is called an incision. You may have some pain in your lower belly and need pain medicine for 1 to 2 weeks. You can expect some vaginal bleeding for several weeks. You will probably need about 6 weeks to fully recover. It's important to take it easy while the incision heals. Avoid heavy lifting, strenuous activities, and exercises that strain the belly muscles while you recover. Ask a family member or friend for help with housework, cooking, and shopping. This care sheet gives you a general idea about how long it will take for you to recover. But each person recovers at a different pace. Follow the steps below to get better as quickly as possible. How can you care for yourself at home? Activity    Rest when you feel tired. Getting enough sleep will help you recover. Try to walk each day. Start by walking a little more than you did the day before. Bit by bit, increase the amount you walk.  Walking boosts blood flow and helps prevent pneumonia, constipation, and blood clots. Avoid strenuous activities, such as bicycle riding, jogging, weightlifting, and aerobic exercise, for 6 weeks or until your doctor says it is okay. Until your doctor says it is okay, do not lift anything heavier than your baby. Do not do sit-ups or other exercises that strain the belly muscles for 6 weeks or until your doctor says it is okay. Hold a pillow over your incision when you cough or take deep breaths. This will support your belly and decrease your pain. You may shower as usual. Pat the incision dry when you are done. You will have some vaginal bleeding. Wear sanitary pads. Do not douche or use tampons until your doctor says it is okay. Ask your doctor when you can drive again. You will probably need to take at least 6 weeks off work. It depends on the type of work you do and how you feel. Ask your doctor when it is okay for you to have sex. Diet    You can eat your normal diet. If your stomach is upset, try bland, low-fat foods like plain rice, broiled chicken, toast, and yogurt. Drink plenty of fluids (unless your doctor tells you not to). You may notice that your bowel movements are not regular right after your surgery. This is common. Try to avoid constipation and straining with bowel movements. You may want to take a fiber supplement every day. If you have not had a bowel movement after a couple of days, ask your doctor about taking a mild laxative. If you are breastfeeding, limit alcohol. Alcohol can cause a lack of energy and other health problems for the baby when a breastfeeding woman drinks heavily. It can also get in the way of a mom's ability to feed her baby or to care for the child in other ways. There isn't a lot of research about exactly how much alcohol can harm a baby. Having no alcohol is the safest choice for your baby.  If you choose to have a drink now and then, have only one drink, and limit the number of occasions that you have a drink. Wait to breastfeed at least 2 hours after you have a drink to reduce the amount of alcohol the baby may get in the milk. Medicines    Your doctor will tell you if and when you can restart your medicines. You will also get instructions about taking any new medicines. If you stopped taking aspirin or some other blood thinner, your doctor will tell you when to start taking it again. Take pain medicines exactly as directed. If the doctor gave you a prescription medicine for pain, take it as prescribed. If you are not taking a prescription pain medicine, ask your doctor if you can take an over-the-counter medicine. If you think your pain medicine is making you sick to your stomach: Take your medicine after meals (unless your doctor has told you not to). Ask your doctor for a different pain medicine. If your doctor prescribed antibiotics, take them as directed. Do not stop taking them just because you feel better. You need to take the full course of antibiotics. Incision care    If you have strips of tape on the incision, leave the tape on for a week or until it falls off. Wash the area daily with warm, soapy water, and pat it dry. Don't use hydrogen peroxide or alcohol, which can slow healing. You may cover the area with a gauze bandage if it weeps or rubs against clothing. Change the bandage every day. Keep the area clean and dry. Other instructions    If you breastfeed your baby, you may be more comfortable while you are healing if you don't rest your baby on your belly. Try tucking your baby under your arm, with your baby's body along the side you will be feeding on. Support your baby's upper body with your arm. With that hand you can control your baby's head to bring your baby's mouth to your breast. This is sometimes called the football hold. Follow-up care is a key part of your treatment and safety.  Be sure to make and go to all appointments, and call your doctor if you are having problems. It's also a good idea to know your test results and keep a list of the medicines you take. When should you call for help? Share this information with your partner, family, or a friend. They can help you watch for warning signs. Call 911  anytime you think you may need emergency care. For example, call if:    You have thoughts of harming yourself, your baby, or another person. You passed out (lost consciousness). You have chest pain, are short of breath, or cough up blood. You have a seizure. Call your doctor now or seek immediate medical care if:    You have loose stitches, or your incision comes open. You have signs of hemorrhage (too much bleeding), such as:  Heavy vaginal bleeding. This means that you are soaking through one or more pads in an hour. Or you pass blood clots bigger than an egg. Feeling dizzy or lightheaded, or you feel like you may faint. Feeling so tired or weak that you cannot do your usual activities. A fast or irregular heartbeat. New or worse belly pain. You have symptoms of infection, such as: Increased pain, swelling, warmth, or redness. Red streaks leading from the incision. Pus draining from the incision. A fever. Vaginal discharge that smells bad. New or worse belly pain. You have symptoms of a blood clot in your leg (called a deep vein thrombosis), such as:  Pain in your calf, back of the knee, thigh, or groin. Redness and swelling in your leg or groin. You have signs of preeclampsia, such as:  Sudden swelling of your face, hands, or feet. New vision problems (such as dimness, blurring, or seeing spots). A severe headache. Watch closely for changes in your health, and be sure to contact your doctor if:    Your vaginal bleeding isn't decreasing. You feel sad, anxious, or hopeless for more than a few days.      You are having problems with your breasts or breastfeeding. Where can you learn more? Go to http://www.woods.com/ and enter M806 to learn more about \" Section: What to Expect at Home. \"  Current as of: 2022               Content Version: 13.6  © 9607-0734 [employer]. Care instructions adapted under license by [facility] [place] ([facility]). If you have questions about a medical condition or this instruction, always ask your healthcare professional. [address] any warranty or liability for your use of this information.

## 2023-03-18 NOTE — DISCHARGE SUMMARY
Obstetrical Discharge Summary     Name: Albina Huitron MRN: [id number]  SSN: [id number]    YOB: 2000  Age: 25 y.o. Sex: female      Allergies: Patient has no known allergies. Admit Date: 3/15/2023    Discharge Date: 3/18/2023     Admitting Physician: Faustina Madden MD     Attending Physician:  Faustina Madden MD     * Admission Diagnoses: Normal labor [O80, Z37.9]    * Discharge Diagnoses:   Information for the patient's :  Lia Dubose [[id number]]   @[id number]@      Additional Diagnoses:    Lab Results   Component Value Date/Time    ABORH A POSITIVE 03/15/2023 05:59 PM      Immunization History   Administered Date(s) Administered    Influenza, FLUCELVAX, (age 10 mo+), MDCK, PF, 0.5mL 2023    Tdap (Boostrix, Adacel) 2023       * Procedures:   Procedure(s):   SECTION           * Discharge Condition: [facility] Course: Normal hospital course following the delivery. * Disposition: Home    Discharge Medications:      Medication List        START taking these medications      ibuprofen 600 MG tablet  Commonly known as: ADVIL;MOTRIN  Take 1 tablet by mouth every 8 hours as needed for Pain     oxyCODONE 5 MG immediate release tablet  Commonly known as: ROXICODONE  Take 1 tablet by mouth every 6 hours as needed for Pain for up to 5 days. Max Daily Amount: 20 mg            CONTINUE taking these medications      ferrous sulfate 325 (65 Fe) MG tablet  Commonly known as: IRON 325     PRENATAL + DHA PO               Where to Get Your Medications        These medications were sent to [id number], [place] - [address] [address]      Phone: [phone]   ibuprofen 600 MG tablet  oxyCODONE 5 MG immediate release tablet         * Follow-up Care/Patient Instructions:   Activity: no sex for 6 weeks, no driving while on analgesics, and no heavy lifting for 6 weeks  Diet: regular diet  Wound Care: keep wound clean and dry

## 2023-03-18 NOTE — PROGRESS NOTES
Post-Operative Progress/Discharge Note    Patient doing well post-op from  delivery without significant complaints. Pain controlled on current medication. Voiding without difficulty, normal lochia. Wishes discharge    Vitals:  Patient Vitals for the past 8 hrs:   BP Temp Temp src Pulse Resp SpO2   23 0700 128/68 97.7 °F (36.5 °C) Oral 93 16 99 %   23 0227 -- -- -- -- 18 --     Temp (24hrs), Av °F (36.7 °C), Min:97.7 °F (36.5 °C), Max:98.5 °F (36.9 °C)      Vital signs stable, afebrile. Exam:  Patient without distress. Abdomen soft, fundus firm at level of umbilicus, non tender. Incision dry and clean without erythema. Lower extremities are negative for swelling, cords or tenderness. Lab/Data Review:      Assessment and Plan:  Patient appears to be having uncomplicated post- course. Continue routine post-op care and maternal education. Plan discharge for today with follow up in our office in 1-2 weeks.

## 2024-10-23 ENCOUNTER — APPOINTMENT (OUTPATIENT)
Dept: GENERAL RADIOLOGY | Age: 24
End: 2024-10-23
Payer: OTHER MISCELLANEOUS

## 2024-10-23 ENCOUNTER — HOSPITAL ENCOUNTER (EMERGENCY)
Age: 24
Discharge: HOME | End: 2024-10-23
Attending: EMERGENCY MEDICINE
Payer: OTHER MISCELLANEOUS

## 2024-10-23 VITALS
HEART RATE: 78 BPM | RESPIRATION RATE: 16 BRPM | DIASTOLIC BLOOD PRESSURE: 90 MMHG | TEMPERATURE: 97.6 F | OXYGEN SATURATION: 100 % | SYSTOLIC BLOOD PRESSURE: 149 MMHG

## 2024-10-23 DIAGNOSIS — S39.012A STRAIN OF LUMBAR REGION, INITIAL ENCOUNTER: Primary | ICD-10-CM

## 2024-10-23 DIAGNOSIS — T14.8XXA ABRASION: ICD-10-CM
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
## 2024-10-23 PROCEDURE — 6370000000 HC RX 637 (ALT 250 FOR IP): Performed by: EMERGENCY MEDICINE

## 2024-10-23 PROCEDURE — 72100 X-RAY EXAM L-S SPINE 2/3 VWS: CPT

## 2024-10-23 PROCEDURE — 99283 EMERGENCY DEPT VISIT LOW MDM: CPT

## 2024-10-23 RX ORDER — IBUPROFEN 600 MG/1
600 TABLET, FILM COATED ORAL
Status: COMPLETED | OUTPATIENT
Start: 2024-10-23 | End: 2024-10-23

## 2024-10-23 RX ORDER — METHOCARBAMOL 750 MG/1
750 TABLET, FILM COATED ORAL 4 TIMES DAILY PRN
Qty: 12 TABLET | Refills: 0 | Status: SHIPPED | OUTPATIENT
Start: 2024-10-23 | End: 2024-10-26

## 2024-10-23 RX ORDER — IBUPROFEN 600 MG/1
600 TABLET, FILM COATED ORAL 3 TIMES DAILY PRN
Qty: 15 TABLET | Refills: 0 | Status: SHIPPED | OUTPATIENT
Start: 2024-10-23 | End: 2024-10-28

## 2024-10-23 RX ADMIN — IBUPROFEN 600 MG: 600 TABLET, FILM COATED ORAL at 09:42

## 2024-10-23 ASSESSMENT — PAIN SCALES - GENERAL: PAINLEVEL_OUTOF10: 6

## 2024-10-23 ASSESSMENT — PAIN DESCRIPTION - LOCATION: LOCATION: BACK;HEAD

## 2024-10-23 ASSESSMENT — PAIN - FUNCTIONAL ASSESSMENT: PAIN_FUNCTIONAL_ASSESSMENT: 0-10
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
## (undated) DEVICE — SUTURE CHROMIC GUT SZ 3-0 L36IN ABSRB BRN L40MM CT 1/2 CIR 912H

## (undated) DEVICE — TUBING, SUCTION, 1/4" X 10', STRAIGHT: Brand: MEDLINE

## (undated) DEVICE — KENDALL SCD EXPRESS SLEEVES, KNEE LENGTH, MEDIUM: Brand: KENDALL SCD

## (undated) DEVICE — SUTURE ABSORBABLE ANTIBACT 1-0 CT-1 24 IN STRATAFIX PDS + SXPP1A443

## (undated) DEVICE — SOLUTION IV 1000ML 0.9% SOD CHL

## (undated) DEVICE — SUTURE PLN GUT SZ 2-0 L27IN ABSRB YELLOWISH TAN L40MM CT 853H

## (undated) DEVICE — SUTURE VCRL SZ 0 L36IN ABSRB UD L48MM CTX 1/2 CIR J978H

## (undated) DEVICE — Device: Brand: PORTEX

## (undated) DEVICE — SUTURE MCRYL SZ 3-0 L27IN ABSRB UD L60MM KS STR REV CUT Y523H

## (undated) DEVICE — ELECTRODE PT RET AD L9FT HI MOIST COND ADH HYDRGEL CORDED

## (undated) DEVICE — AMD ANTIMICROBIAL NON-ADHERENT PAD,0.2% POLYHEXAMETHYLENE BIGUANIDE HCI (PHMB): Brand: TELFA

## (undated) DEVICE — SUTURE CHROMIC GUT SZ 0 L27IN ABSRB BRN L36MM CT-1 1/2 CIR 812H

## (undated) DEVICE — SOLUTION IRRIG 1000ML H2O STRL BLT

## (undated) DEVICE — KIT URIN CATH L16FR BLLN 5ML INDWL STR TIP INF CTRL URIN

## (undated) DEVICE — PENCIL ES L3M BTTN SWCH S STL HEX LOK BLDE ELECTRD HOLSTER

## (undated) DEVICE — SURGICAL PROCEDURE PACK C SECT CDS

## (undated) DEVICE — APPLICATOR MEDICATED 26 CC SOLUTION HI LT ORNG CHLORAPREP

## (undated) DEVICE — SUTURE VCRL SZ 3-0 L36IN ABSRB UD L36MM CT-1 1/2 CIR J944H

## (undated) DEVICE — AMD ANTIMICROBIAL GAUZE SPONGES,12 PLY USP TYPE VII, 0.2% POLYHEXAMETHYLENE BIGUANIDE HCI (PHMB): Brand: CURITY